# Patient Record
(demographics unavailable — no encounter records)

---

## 2024-10-16 NOTE — PHYSICAL EXAM
[Alert] : alert [Healthy Appearance] : healthy appearance [No Acute Distress] : no acute distress [Normal Sclera/Conjunctiva] : normal sclera/conjunctiva [Normal Hearing] : hearing was normal [No Respiratory Distress] : no respiratory distress [No Stigmata of Cushings Syndrome] : no stigmata of Cushings Syndrome [Normal Gait] : normal gait [Normal Insight/Judgement] : insight and judgment were intact [Kyphosis] : no kyphosis present [Acanthosis Nigricans] : no acanthosis nigricans [de-identified] : no moon facies, no supraclavicular fat pads

## 2024-10-16 NOTE — HISTORY OF PRESENT ILLNESS
[FreeTextEntry1] : Mr. Messer is a 60 year-old man with a history of chronic lymphocytic leukemia, diarrhea presenting for follow-up of subclinical hypothyroidism and weight gain. I saw him for an initial visit in July 2019 and last in April 2024.  History of subclinical hypothyroidism. Laboratory tests in May 2019 significant for TSH 4.93 uIU/mL (normal: 0.27-4.20) with normal free T4. Prior TSH 1.50 uIU/mL in July 2016. TSH in July 2019 again elevated at 4.48 uIU/mL with normal T4/T3 and negative antibodies. We started a trial of levothyroxine 50 mcg daily in July 2019; he felt no difference on levothyroxine therapy and we discontinued in November. TSH subsequently within range until April 2023, when his TSH was 7.58 uIU/mL. He was started on levothyroxine 25 mg daily at that time. We have adjusted his dose to 50 mcg daily. He is currently taking levothyroxine 50 mcg daily.  He is taking levothyroxine in the morning, on an empty stomach, with plain water, and waiting at least 30 minutes before eating. He is taking a calcium supplement and  from levothyroxine.  No history of radiation exposure other than medical imaging. No family history of thyroid disease.  Elevated body mass index. History of knee osteoarthritis.  His highest weight in our system was 187 pounds.  He estimated 1500 kcal/day intake and was walking >10K steps daily; he had multiple metacarpal fractures and physical activity was subsequently limited.  In November 2019 we started bupropion with good effect. He stopped in February 2021 during hospitalization for COVID-19 infection and restarted in July 2021. In May 2023 we started naltrexone 25 mg daily; he had been off due to a lapsed prescription.  He is currently taking bupropion  mg daily and naltrexone 25 mg daily.  His personal goal weight is 150 pounds.   Osteopenia.  Bone density testing in May 2022 significant for T-scores of -0.5 at the lumbar spine, -1.5 at the femoral neck, and -0.9 at the total hip. His 10 year fracture risk calculated by FRAX is 5.3% for major osteoporotic fracture and 0.6% for hip fracture, below the treatment thresholds.  He has a history of metatarsal fractures and a stress fracture in his foot.  No parental history of hip fracture.  He has at least a glass of milk daily and a calcium supplement with vitamin D.   Interim History  Last TSH within range as below on levothyroxine 50 mcg daily.  He was hospitalized for Campylobacter fetus bacteremia and pericarditis and has been on intravenous immunoglobulin and meropenem therapy.  He has seen multiple providers; notes reviewed.  Weight is down 14 pounds since last visit. No acute issues. Medical and surgical history, medications, allergies, social and family history reviewed and updated as needed.

## 2024-10-16 NOTE — ASSESSMENT
[FreeTextEntry1] : Subclinical hypothyroidism. We started a trial of levothyroxine for TSH above the upper limit of normal but below 6.9 uIU/mL; he noted no difference in symptoms on levothyroxine therapy and we discontinued. TSH was subsequently above 7.5 uIU/mL and levothyroxine therapy was restarted. We have reviewed proper use and compliance with levothyroxine. He has been biochemically euthyroid on levothyroxine 50 mcg daily.  Weight gain. History of knee osteoarthritis. His weight has been up and down with his acute illness. He has been biochemically euthyroid. We will evaluate for endogenous overproduction of cortisol once his acute illness has resolved. His personal goal weight is 150 pounds. We have discussed lifestyle modifications for weight loss. We have discussed referral to nutrition. We have discussed pharmacologic options for weight loss. He is tolerating bupropion and naltrexone and we will continue. We can consider metformin or a GLP-1 receptor agonist in the future as needed.  Osteopenia. He has a history of metatarsal fractures and a stress fracture in his foot. No parental history of hip fracture. He has at least a glass of milk daily and a calcium supplement with vitamin D. His 10 year fracture risk calculated by FRAX is 5.3% for major osteoporotic fracture and 0.6% for hip fracture, below the treatment thresholds. We will consider interval bone density testing in May 2025.  Elevated normetanephrines. Laboratory evaluation for hypertension demonstrated an elevated plasma normetanephrine, although less than two times the upper limit of normal. 24 hour urine test results from June 2023 demonstrated a borderline elevated epinephrine; there would be concern if this value were 2-3 times the upper limit of normal. Metanephrines and catecholamines otherwise within the normal range in May 2023 and January 2024. No further evaluation needed at this time.   Return to see me in 6 months or earlier as needed.

## 2024-10-16 NOTE — DATA REVIEWED
[FreeTextEntry1] : Most recent bone mineral density Date: May 2, 2022 Source: Hologic Site: [facility]  Site	BMD	T-score	Change previous	Change baseline	 Lumbar spine	1.034	-0.5			 Femoral neck	0.725	-1.5			 Total hip           0.894	-0.9			 Distal radius					 DXA comments: Baseline scan at this facility  Impression: I have personally reviewed the DXA images and report. There is osteopenia at the femoral neck.

## 2024-10-21 NOTE — PHYSICAL EXAM
[Alert] : alert [Well Nourished] : well nourished [Healthy Appearance] : healthy appearance [No Acute Distress] : no acute distress [Normal Voice/Communication] : normal voice communication [No Discharge] : no discharge [No Photophobia] : no photophobia [Sclera Not Icteric] : sclera not icteric [Normal TMs] : both tympanic membranes were normal [Normal Lips/Tongue] : the lips and tongue were normal [Normal Outer Ear/Nose] : the ears and nose were normal in appearance [No Thrush] : no thrush [Boggy Nasal Turbinates] : boggy and/or pale nasal turbinates [Posterior Pharyngeal Cobblestoning] : posterior pharyngeal cobblestoning [Supple] : the neck was supple [Normal Rate and Effort] : normal respiratory rhythm and effort [No Crackles] : no crackles [Bilateral Audible Breath Sounds] : bilateral audible breath sounds [Normal Rate] : heart rate was normal  [Normal S1, S2] : normal S1 and S2 [Regular Rhythm] : with a regular rhythm [Soft] : abdomen soft [Not Tender] : non-tender [No HSM] : no hepato-splenomegaly [Normal Cervical Lymph Nodes] : cervical [No Rash] : no rash [No Skin Lesions] : no skin lesions [No clubbing] : no clubbing [No Edema] : no edema [No Cyanosis] : no cyanosis [Normal Mood] : mood was normal [Normal Affect] : affect was normal [Alert, Awake, Oriented as Age-Appropriate] : alert, awake, oriented as age appropriate [Conjunctival Erythema] : no conjunctival erythema [Suborbital Bogginess] : no suborbital bogginess (allergic shiners) [Pale mucosa] : no pale mucosa [Pharyngeal erythema] : no pharyngeal erythema [Exudate] : no exudate [Wheezing] : no wheezing was heard [Urticaria] : no urticaria [Dermatographism] : no dermatographism

## 2024-10-21 NOTE — HISTORY OF PRESENT ILLNESS
[de-identified] : DANITA MARC is a 60 year old male with CLL/SLL, PMH of PE, DVT, subclinical hypothyroidism, ascending aortic aneurism, s/p Rituximab, now on Imbruvica, returns for follow up. = s/p Rituxan (total of 6 doses) and Bendamustine (completed 1/2017), = on Imbruvica since May 2018. = former smoker (8 pack year hx) = Immune labs: --- s/p PCV13 6/2020 and PPSV 10/21, no response to vaccinations; Strep pneumo 11/2021= +3/20 --- 6/2021- protective tetanus- 0.91 --- 12/2022- normal lymphocyte immunophenotype; --- 12/2022- B-cell phenotyping, [place]: decreased CD20+, normal CD19+, predominantly memory B-cells-CD27+CD19+ (94.3%)   10/21/24: - 8/22/24 - 8/13/24: Hospitalized at [facility]- received 1st dose of immunoglobulin replacement therapy (IGRT):  Gamunex 35g From hem note: --- At the end of July, he developed progressive chest pain and proceeded to the ER, where he was found to have evidence of a small pericardial effusion and pericarditis. CTA chest was negative for PE. --- For his pleuritic chest pain, he was found to have pericarditis and discharged on colchicine and ASA. --- For his diarrhea, he was found to have campylobacter fetus bacteremia. He was treated with 10 days of Unasyn. EGD/colonoscopy were grossly unyielding. He was discharged on cholestyramine and loperamide. --- For his known hypogammoglobulinemia, he received IVIG on 8/3/24 (5g) and 8/4/24 (30g). - For his rash, a punch biopsy was done which showed urticaria. --- rash resolved  - 9/2024- recurrence of Campylobacter fetus bacteremia, started on Meropenem  - [facility] for Gamunex-C (ins. preferred product) 35 grams IV every 4 weeks with acetaminophen and diphenhydramine pre-medication.  - [place] labs from 10/14/24:  --- IgG- 815, IgA-71, IgM- 61; labs are 2 weeks post-IVIG; last IVIG-9/30/24 - On Meropenem since 10/6/24 - Overall feels better on Meropenem: diarrhea resolved; still has residual cough, postnasal drip and SOB on exertion - He used to see Dr Chau for cough after completing rituximab, was on symbicort at the time   7/3/2024 The patient, would like to start IVIG sub q.  Last month he was in [place], developed swollen  arms, lower extremity and abdominal area. DVT was r/o. PMD, placed the patient on oral steroid and antibiotic. Swelling subsided within few days.    Today, had chest, abdominal and pelves CT- by Hematogist.  -Currently, feeling well.  1/11/24: - received Prevnar 20 11/28/24 in AM, ~ 12 hours later developed chills, crampy abdominal pain with several bouts of non-bloody diarrhea, lethargy around 8PM. He called EMS however pt declined to go to the ER. Just took Acetaminophen (Tylenol). He never had vaccine reactions or similar symptoms - 12/26/23- developed symptoms of COVID-19, we spoke 12/28/23 after he tested positive, remdesivir was ordered and he received 3 doses. He states that he felt much better after remdesivir. Still has some headaches and cough. He hasnt used any inhalers, but does have albuterol at home. He is not sure who prescribed it and why.   11/27/23: No new complains and no interval problems. He may have needed 1 course of antibiotics over the summer, but not sure. He feels well. He had severe COVID-19 pneumonia in 2021, also diagnosed with PE, received Remdisivir, required O2 up to 60%. He complains of chronic postnasal drip and nasal congestion, denies colored nasal discharge He started seeing new hematologist, Dr Gibbs  6/19/23: He notes that on Friday night he swallowed a handful of pills which he normally does without issue. He feels like one of the pills is stuck in his windpipe. He has chest pain that is worse when he eats. He feels an "air buildup" behind it, has burping. No shortness of breath. He does have cough due to the sensation of "mucus building up behind the pill." He has not sought care for this.  He had a viral illness at the end of May and reports that he had fevers, persistent cough for a month. He was started on singulair which got rid of his cough.  Previous visit Dec 2022: Last visit was in June 2022. Since then he has been infection-free, no interval abx use. He states he had gross hematuria back in August. CT A/P was performed which showed bladder wall thickening, possible cystitis. Hematuria self-resolved, no antibiotics use. He is a former smoker and has not seen urology yet.  Traction bronchiectasis was seen on CT in April. He did have COVID previously. Breathing is okay. Complains of fatigue.  = received Moderna COVID-19 vaccine #4: 4/2021, 5/2021, 8/2021, 2/2022, 11/2022. had COVID-19 disease 1/2021, hospitalized for 2 weeks due to SOB, was offered ICU due to hypoxia but refused. He was on O2 after he = got home for 4 months.   HISTORY: - He injured his left foot in August 2021. He reports that he went into work wearing dress shoes, first time in a long time. When he came home, he had pain in his left foot. He did not go see a doctor for about a month, but was eventually diagnosed with multiple fractures and now is in a cast. Per patient, it is healing slowly but as expected. He's had congestion and cough for about 1 week. COVID test was negative. He is on his last day of [employer] today. Denies other illnesses or infections. Of note, patient reports that he is on almost daily saline nasal rinses and Mucinex chronically. He wants IGRT to be the last resort. He received Pneumonvax on 10/1/21 but did not have antibody response. He is still on Imbruvica. Only uses Fluticasone and Symbicort occasionally.   - admitted to [facility] 2/21 for COVID-19 pneumonia, also diagnosed with PE, received Remdisivir, required O2 up to 60%, not intubated. He had COVID-19 antibodies when he left the hospital but none by 4/2021. He still uses some O2 when works out, but overall he feels significantly better, especially in the last few weeks. - He received his 2nd Moderna vaccine 5/2021. - No other interval illnesses. or infections.  = He was found to have low quantitative immunoglobulins IgG-576, IgA-82, IgM-16, protective tetanus and low diphtheria, S.Pneumonie titers after receiving prevnar in 2016 during chemo treatment and PPSV 3/2018.  = For CLL he was treated with Rituxan (total of 6 doses) and Bendamustine (completed 1/2017), currently on Imbruvica ( since May 2018.  Interval History: - 12/15/20- Continues to feel well. He intentionally lost a lot of weight: he walks >100 ml a week, eats healthier, works from home. No interval issues. Has some Imbruvica side effects: GI, dry skin, nails but they are tolerable and he is happy with control of his CLL. He uses nasal fluticasone occasionally. = Spirometry 12/8/20 reviewed: normal = No response to PCV 6/2020; s/p PPSV23 in 2018  - 6/2020- He has been doing very well during pandemic. He has been working from home and feels that the stress of 4 hour commute a day is gone and now he is able to walk daily, eats healthier, sleeps better. He lost weight (on purpose) and feels healthier. No illnesses since 11/2019. No sick contacts, including COVID. He has been consistent with Imbruvica ad develops LAP every time he tries to stop it. - 11/2019- had fever to 103.8 with severe diarrhea necessitating ED visit and Azithromycin course. Diarrhea lasted 3 weeks and did not determine the cause. It happened a few days after getting the flu shot. Also has continued persistent nasal congestion that is treated with sinus rinses and Nasonex but has improved from previously. Previously, we recommended starting IGRT therapy but patient has been resistant to starting this treatment. He is here to follow up on labs and discuss this further.  History of infections: 11/2018- sick for over 3 months with sinus infection, C/S by Dr Casas: HIB, Staph lucdusensis. He was treated with a long course of Amoxicillin/Clavulanate Potassium (Augmentin) and Prednisone 2001- pneumonia b/l, required hospitalization He always had sinus issues, he had his nose broken many times playing soccer, s/p 2 rhinoplasties 30 years ago.

## 2024-10-22 NOTE — HEALTH RISK ASSESSMENT
[Intercurrent hospitalizations] : was admitted to the hospital  [Never (0 pts)] : Never (0 points) [No] : In the past 12 months have you used drugs other than those required for medical reasons? No [Little interest or pleasure doing things] : 1) Little interest or pleasure doing things [Feeling down, depressed, or hopeless] : 2) Feeling down, depressed, or hopeless [0] : 2) Feeling down, depressed, or hopeless: Not at all (0) [PHQ-2 Negative - No further assessment needed] : PHQ-2 Negative - No further assessment needed [None] : None [With Significant Other] : lives with significant other [Employed] : employed [College] : College [Significant Other] : lives with significant other [Sexually Active] : sexually active [Feels Safe at Home] : Feels safe at home [Fully functional (bathing, dressing, toileting, transferring, walking, feeding)] : Fully functional (bathing, dressing, toileting, transferring, walking, feeding) [Fully functional (using the telephone, shopping, preparing meals, housekeeping, doing laundry, using] : Fully functional and needs no help or supervision to perform IADLs (using the telephone, shopping, preparing meals, housekeeping, doing laundry, using transportation, managing medications and managing finances) [Smoke Detector] : smoke detector [Carbon Monoxide Detector] : carbon monoxide detector [Safety elements used in home] : safety elements used in home [Seat Belt] :  uses seat belt [Sunscreen] : uses sunscreen [With Patient/Caregiver] : , with patient/caregiver [Reviewed no changes] : Reviewed, no changes [I will adhere to the patient's wishes.] : I will adhere to the patient's wishes. [Former] : Former [5-9] : 5-9 [NO] : No [de-identified] : Infectious disease, hematology oncology [Audit-CScore] : 0 [[id number]] : 0 [Change in mental status noted] : No change in mental status noted [Language] : denies difficulty with language [Behavior] : denies difficulty with behavior [Learning/Retaining New Information] : denies difficulty learning/retaining new information [Handling Complex Tasks] : denies difficulty handling complex tasks [Reasoning] : denies difficulty with reasoning [Spatial Ability and Orientation] : denies difficulty with spatial ability and orientation [High Risk Behavior] : no high risk behavior [Reports changes in hearing] : Reports no changes in hearing [Reports changes in vision] : Reports no changes in vision [Reports normal functional visual acuity (ie: able to read med bottle)] : Reports poor functional visual acuity.  [Reports changes in dental health] : Reports no changes in dental health [Travel to Developing Areas] : does not  travel to developing areas [TB Exposure] : is not being exposed to tuberculosis [Caregiver Concerns] : does not have caregiver concerns [AdvancecareDate] : 10/24

## 2024-10-22 NOTE — PHYSICAL EXAM
[No Acute Distress] : no acute distress [Well Nourished] : well nourished [Well Developed] : well developed [Well-Appearing] : well-appearing [Normal Sclera/Conjunctiva] : normal sclera/conjunctiva [PERRL] : pupils equal round and reactive to light [EOMI] : extraocular movements intact [Normal Outer Ear/Nose] : the outer ears and nose were normal in appearance [Normal Oropharynx] : the oropharynx was normal [No JVD] : no jugular venous distention [No Lymphadenopathy] : no lymphadenopathy [Supple] : supple [Thyroid Normal, No Nodules] : the thyroid was normal and there were no nodules present [No Respiratory Distress] : no respiratory distress  [No Accessory Muscle Use] : no accessory muscle use [Clear to Auscultation] : lungs were clear to auscultation bilaterally [Normal Rate] : normal rate  [Regular Rhythm] : with a regular rhythm [Normal S1, S2] : normal S1 and S2 [No Murmur] : no murmur heard [No Carotid Bruits] : no carotid bruits [No Abdominal Bruit] : a ~M bruit was not heard ~T in the abdomen [No Varicosities] : no varicosities [Pedal Pulses Present] : the pedal pulses are present [No Edema] : there was no peripheral edema [No Palpable Aorta] : no palpable aorta [No Extremity Clubbing/Cyanosis] : no extremity clubbing/cyanosis [Soft] : abdomen soft [Non Tender] : non-tender [Non-distended] : non-distended [No Masses] : no abdominal mass palpated [No HSM] : no HSM [Normal Bowel Sounds] : normal bowel sounds [Normal Posterior Cervical Nodes] : no posterior cervical lymphadenopathy [Normal Anterior Cervical Nodes] : no anterior cervical lymphadenopathy [No CVA Tenderness] : no CVA  tenderness [No Spinal Tenderness] : no spinal tenderness [No Joint Swelling] : no joint swelling [Grossly Normal Strength/Tone] : grossly normal strength/tone [Coordination Grossly Intact] : coordination grossly intact [No Focal Deficits] : no focal deficits [Normal Gait] : normal gait [Deep Tendon Reflexes (DTR)] : deep tendon reflexes were 2+ and symmetric [Speech Grossly Normal] : speech grossly normal [Memory Grossly Normal] : memory grossly normal [Normal Affect] : the affect was normal [Alert and Oriented x3] : oriented to person, place, and time [Normal Mood] : the mood was normal [Normal Insight/Judgement] : insight and judgment were intact [de-identified] : Rash some residual but I would have to say greater than 99% resolved.

## 2024-10-22 NOTE — HISTORY OF PRESENT ILLNESS
[Post-hospitalization from ___ Hospital] : Post-hospitalization from [unfilled] Hospital [Admitted on: ___] : The patient was admitted on [unfilled] [Discharged on ___] : discharged on [unfilled] [Discharge Summary] : discharge summary [Pertinent Labs] : pertinent labs [Radiology Findings] : radiology findings [Discharge Med List] : discharge medication list [Other: ____] : [unfilled] [Med Reconciliation] : medication reconciliation has been completed [Patient Contacted By: ____] : and contacted by [unfilled] [FreeTextEntry2] : Patient is a 60-year-old gentleman who presents today for follow-up status post hospitalization.  Medical history significant for CLL/SLL, hypogammaglobulinemia, recurrent DVT/PE x 2 and recent diagnosis of acute pericarditis, subclinical hypothyroidism, ascending aortic aneurysm who presented at [facility] from outpatient clinic for positive blood cultures that his Campylobacter Fetus.  Patient was first admitted to [facility] on August 2024 for diarrhea that had started previously on in May 2024 which was accompanied with arthralgias, rash, chest pain, shortness of breath subsequently diagnosed with Campylobacter and patient was treated for 7 days with Unasyn blood cultures cleared early August that is August 6, 2024 and subsequently patient was discharged with antibiotics.  Unfortunately symptoms returned blood cultures obtained as an outpatient again positive for Campylobacter subsequently the patient was admitted at [facility] started on meropenem and was followed by Dr. Pearl who also follows the patient as an outpatient.  Complete discharge summary in chart.  Patient was admitted on October 10, 2024 subsequently discharged on October 12, 2024 PICC line was placed on October 11 meropenem started 1 g IV every 8 hours via PICC line.

## 2024-10-22 NOTE — ASSESSMENT
[FreeTextEntry1] : Assessment and plan:  1.  Status post hospitalization patient treated for Campylobacter bacteremia, PICC line placed on October 11, 2024 presently on meropenem IV 1 g IV Q8 treatment will go on until November 3 patient is following up with infectious diseases tomorrow.  2.  Fluzone influenza vaccine administered at this visit administered left deltoid.  3.  Hypothyroidism continue levothyroxine 25 mcg daily.  Endocrinology requested follow-up blood work which was done at this visit I did check free T3, free T4 TSH, comprehensive metabolic.  4.  CLL patient followed closely by hematology oncology he will continue present medical management with Imbruvica 420 mg 1 tablet daily.  Reviewed most recent consultation.  5.  Gastroesophageal reflux continue omeprazole 40 mg p.o. daily patient takes medications in a.m. before breakfast.  Patient also has history of colon polyps he is a bit behind with colonoscopy unfortunately his gastroenterologist left the system therefore referral given for gastroenterology evaluation and for colonoscopy.  6.  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  7.  Hyperlipidemia patient does follow a low-fat low-cholesterol diet walks approximately 5 miles daily and will continue rosuvastatin 10 mg p.o. daily.  8.  Patient will continue present medical management no change, I will review comprehensive blood work when available follow-up appointment in 6 months sooner if necessary.  Total time spent face-to-face and non-face-to-face time 45 minutes the majority of which was spent on counseling and coordination of care.

## 2024-10-22 NOTE — REVIEW OF SYSTEMS
[Fatigue] : fatigue [Cough] : cough [Dyspnea on Exertion] : dyspnea on exertion [Diarrhea] : diarrhea [Skin Rash] : skin rash [Negative] : Heme/Lymph [Wheezing] : no wheezing [Abdominal Pain] : no abdominal pain [Nausea] : no nausea [Constipation] : no constipation [Vomiting] : no vomiting [Heartburn] : no heartburn [Melena] : no melena [FreeTextEntry7] : diarrhea is not much improved with antibiotic treatment. [de-identified] : Rash is painful to palpation and blanches

## 2024-10-23 NOTE — ASSESSMENT
[FreeTextEntry1] : # Recurrent/persistent C.fetus bacteremia - no clear focus of infection identified on extensive workup other than possible ongoing bowel source. Improving. - Continue meropenem 1g IV q8h x minimum 4 week duration (EOT 11/3) - Will call patient next week to re-assess symptoms and decide on if can stop abx as planned on 11/3. His symptoms are already much improved and resolving, and I anticipate we will be able to stop abx on 11/3. - After abx discontinuation will follow patient closely to monitor for recurrent sx, and also will check BCx after off abx ~2 weeks. - RTC 3 weeks  # Mild tachycardia - no LUE swelling, PHILLIPS present for months and is slowly improving. Low suspicion at this time for PE, but advised patient to monitor for worsened PHILLIPS, chest pain, LUE swelling and present to ED immediately if these develop.

## 2024-10-23 NOTE — PHYSICAL EXAM
[General Appearance - Alert] : alert [General Appearance - Well-Appearing] : healthy appearing [Oropharynx] : the oropharynx was normal with no thrush [Auscultation Breath Sounds / Voice Sounds] : lungs were clear to auscultation bilaterally [Abdomen Soft] : soft [Abdomen Tenderness] : non-tender [FreeTextEntry1] : No LUE swelling. LUE midline in place, dressing c/d/i [] : no rash [Oriented To Time, Place, And Person] : oriented to person, place, and time

## 2024-10-23 NOTE — HISTORY OF PRESENT ILLNESS
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 Soon after he developed fevers and night sweats, and then in 6/2024 a rash on torso and legs. Then in 8/2024 he started developing positional chest pain and SOB (fevers/sweats/diarrhea/rash had persisted through this time), and he was admitted to [facility] in 8/2024 for these sx and found to have C.fetus bacteremia and possible infectious pericarditis, treated with ~7 days of unasyn, BCx cleared, and then discharged without additional antibiotics. He initially felt better and sx improved somewhat, however in 9/2024 all of his his symptoms returned (rash, diarrhea, chest pain, fevers, night sweats). Repeat blood cultures 9/26 at outpatient grew C.fetus again. I called patient once I was alerted to these results last week and advised him to present to ED which he did on 10/6 after arranging some things at home (dog care etc). Repeat BCx 10/6 and 10/7 negative (may be from few doses of cipro at home before coming in). PET/CT without any clear focus of infection other than noted bowel uptake, and posterior R humerus uptake (R arm completely normal on exam, and no hx of surgery there). TTE with resolved pericardial effusion, no valvular lesions. SOCRATES negative. BUE/BLE duplex neg. A LUE midline was placed on 10/11 and patient was discharged home with meropenem 1g IV q8h to complete minimum 4 week duration (EOT 11/3). Following with immunology for IVIG.  Since hospital discharge patient reports feeling much improved. Sweats much improved - now only ~1 out of 3 nights. Diarrhea resolving, more solid, still going 2-3x/day. Chest pain resolved. Does have a cough (productive small clear sputum) and some PHILLIPS (but was able to walk 40 blocks to office today, and PHILLIPS is improving). No L arm swelling.

## 2024-11-01 NOTE — PHYSICAL EXAM
[TextEntry] : General: NAD Cardiac: nl s1s2, RRR, no mrg, no JVD, no peripheral edema Lungs: CTAB. Normal respiratory effort Vascular: lower extremities warm/well perfused, midline in place in LUE Neuro: moves all extremities Psych: normal affect, normal mood

## 2024-11-01 NOTE — HISTORY OF PRESENT ILLNESS
[FreeTextEntry1] : Mr. MARC is a 60 year male presenting for management of acute pericarditis. Here with his gibran Cowan.   Notable PMHx: - Incessant Pericarditis 2/2 campylobacter fetus (dx in 8/2024) - Ascending Aortic Aneurysm (4.1 cm by TTE in 9/2024) - recurrent DVT/PE on chronic Eliquis - CLL/SLL (s/p bendamustine/rituximab, on daily ibrutinib) - hypogammaglobulinemia - subclinical hypothyroidism  HPI: Blood cx ordered at last visit came back with campylobacter. Eventually presented to [facility] for management he was seen by ID, cards, and onc. He underwent SOCRATES and PET/CT that showed no evidence of endocarditis or focus for his campylobacter fetus. Thought to be GI translocation in setting of immunocompromised state. He was sent home with a midline and meropenem x 4 weeks. Since discharge, no chest pain. He has quite a bit phlegm and secretions but otherwise no fevers. Rare night sweats and diarrhea, much better since discharge. No rashes  9/26/24 TTE showed resolution of pericardial effusion. He reports interval worsening of his chest pain and has developed night sweats as well. He reports a small circular erythematous rashes on his abdomen that were similar in appearance to when he first initially presented. He had a low grade temp to 100F last week but otherwise has not had any fevers. Notes diarrhea that has been worsening since discharge. Otherwise no recent travel, sick contacts, N/V. He was seen by his oncologist yesterday who recommended he follow-up with ID and he is currently awaiting an appointment for this.   Visit 9/12/24: Since last visit, his pain has improved but is still present. Though he notes the pain is actually better when laying flat changed from prior. He did have some constant pain in the same area that resolved with one dose of tylenol. No bleeding issues while on high dose ASA. No dyspnea.  Visit 9/5/2024: Here for follow up after hospital admission at [facility] 8/2-8/13  for multiple symptoms including diarrhea, fevers, abdominal distention, rash, and chest pain. Found to have diffuse ST elevation, ESR 69, , and TTE with normal biventricular function and a small pericardial effusion.  The differential for his pericarditis was ibrutinib related though he had been on this for 7 years and campylobacter bacteremia. He was treated with abx and ASA, colchicine, and PPI for pericarditis. Since discharge his pain was initially improving but began to recur since he had IVIG on Saturday. He reports the same positional, pleuritic chest pain though of significantly lesser severity than prior but increased when compared to immeidately prior to IVIG. He experiences dyspnea after walking his dogs about 3 miles though this was occurring well prior to his presentation. His pain is not exertional. Increased ASA dose with close follow-up and repeat TTE  Prior Data Review Relevant Labs - ESR: 73 (7/24/24) --> 69 (8/2/24) --> 73 (9/12/24) --> 29 (10/7/224) - CRP: 140 (7/24/24) --> 247 (8/2/24) --> 61 (9/12/24) --> 5.1 (10/7/24) EKG - 11/1/24: SR. Low voltage. nonspecific ST/T wave abnormality.  - 9/26/2024. SR. Anterior TWI. - 9/11/2024: SR. Nonspecific T wave abnormality. - 9/5/2024: SR. Diffuse TWI likely representing resolution of pericarditis. Compared to prior EKG on 8/3/2024 the TWI are new and ST elevations have resolved. Echo - 10/9/24 SOCRATES: mild MR, mild TR, mildly dilated aortic root 4.1, no vegetations - 10/7/24 TTE: nl LV and RV, no sig valve disease, no effusion, root mildly dilated 4.2 - 9/2024 TTE: LVEF 55-60%, nl RV, mild TR, dilated aortic root 4.1cm, dilated ascending aorta (4.0m), no pericardial effusion - 8/2024 TTE: LVEF 54%, no rwma, g1dd, mildly enlarged RV with normal size, trace pericardial effusion.  Cath - no prior Stress Test - no prior  Plan - ASA stopped d/t bleeding risk - c/w colchicine

## 2024-11-01 NOTE — DISCUSSION/SUMMARY
[EKG obtained to assist in diagnosis and management of assessed problem(s)] : EKG obtained to assist in diagnosis and management of assessed problem(s) [FreeTextEntry1] : Here for follow-up for management of acute pericarditis.  He had recurrence of Campylobacter bacteremia requiring readmission.  During admission his SOCRATES and PET showed no evidence of endocarditis or foci of infection.  Showed no pericardial effusion as well.  Inflammatory markers have trended down. Pleuritic chest pain resolved. He is now on meropenem for 4 weeks via midline.  # Incessant Pericarditis 2/2 Campylobacter Fetus Bacteremia - ASA stopped given his bleeding risk while on eliquis and having completed 2 months of treatment - c/w colchicine x 6 months total (end date in 2/2025)  RTC in 3 months or sooner PRN

## 2024-11-06 NOTE — ASSESSMENT
[FreeTextEntry1] : Advised the following:  Cholestyramine twice daily for 4-6 weeks Can take imodium as needed but would limit to twice daily. Probiotic daily. Regular diet.  Follow up in 8 weeks.

## 2024-11-06 NOTE — HISTORY OF PRESENT ILLNESS
[de-identified] : [facility] ____________________________________________________________________________________________________ Patient Name: Gurdeep Messer                         MRN: [id number] Account Number: [id number]                     YOB: 1964 Room: Endoscopy Room 3                           Gender: Male Attending MD: Glenn Bundy MD          Procedure Date No Time: 8/12/2024 ____________________________________________________________________________________________________   Procedure:           Upper GI endoscopy Indications:         Diarrhea Providers:           Glenn Bundy MD, Brett Du (Fellow) Medicines:           See the Anesthesia note for documentation of the administered medications Complications:       No immediate complications. ____________________________________________________________________________________________________ Procedure:           Pre-Anesthesia Assessment:                      - Prior to the procedure, a History and Physical was performed, and patient                       medications and allergies were reviewed. The patient is competent. The risks                       and benefits of the procedure and the sedation options and risks were                       discussed with the patient. All questions were answered and informed consent                       was obtained. Patient identification and proposed procedure were verified by                       the physician and the nurse in the pre-procedure area. Mental Status                       Examination: alert and oriented. Prophylactic Antibiotics: The patient does                       not require prophylactic antibiotics. Prior Anticoagulants: The patient has                       taken no previous anticoagulant or antiplatelet agents. ASA Grade Assessment:                       III - A patient with severe systemic disease. After reviewing the risks and                       benefits, the patient was deemed in satisfactory condition to undergo the                       procedure. The anesthesia plan was to use monitored anesthesia care (MAC).                       Immediately prior to administration of medications, the patient was                       re-assessed for adequacy to receive sedatives. The heart rate, respiratory                       rate, oxygen saturations, blood pressure, adequacy of pulmonary ventilation,                       and response to care were monitored throughout the procedure. The physical                       status of the patient was re-assessed after the procedure.                      After obtaining informed consent, the endoscope was passed under direct                       vision. Throughout the procedure, the patient's blood pressure, pulse, and                       oxygen saturations were monitored continuously. The was introduced through                       the mouth, and advanced to the second part of duodenum. The upper GI                       endoscopy was accomplished without difficulty. The patient tolerated the                       procedure well.                                                                                                      Findings:      The Z-line was irregular and was found 43 cm from the incisors.      The examined esophagus was normal.      The entire examined stomach was normal. This was biopsied with a cold forceps for histology.      The duodenal bulb and second portion of the duodenum were normal. Biopsies were taken with a       cold forceps for histology.                                                                                                      Impression:          - Z-line irregular, 43 cm from the incisors.                      - Normal esophagus.                      - Normal stomach. Biopsied.                      - Normal duodenal bulb and second portion of the duodenum. Biopsied second                       portion of the duodenum. Recommendation:      - Resume regular diet.                      - Please start cholestyramine daily and immodium PRN for diarrhea while                       awaiting biopsy results                      - Await Pathology results                                                                                                      Attending Participation:      I was present and participated during the entire procedure, including non-key portions.                                                                                                       ________________________ Glenn Bundy MD 8/12/2024 1:51:11 PM Number of Addenda: 0  [FreeTextEntry1] : [facility] ____________________________________________________________________________________________________ Patient Name: Gurdeep Messer                         MRN: [id number] Account Number: [id number]                     YOB: 1964 Room: Endoscopy Room 3                           Gender: Male Attending MD: Glenn Bundy MD          Procedure Date No Time: 8/12/2024 ____________________________________________________________________________________________________   Procedure:           Colonoscopy Indications:         Chronic diarrhea Providers:           Glenn Bundy MD, Brett Du (Fellow) Medicines:           See the Anesthesia note for documentation of the administered medications Complications:       No immediate complications. ____________________________________________________________________________________________________ Procedure:           Pre-Anesthesia Assessment:                      - Prior to the procedure, a History and Physical was performed, and patient                       medications and allergies were reviewed. The patient is competent. The risks                       and benefits of the procedure and the sedation options and risks were                       discussed with the patient. All questions were answered and informed consent                       was obtained. Patient identification and proposed procedure were verified by                       the physician and the nurse in the pre-procedure area. Mental Status                       Examination: alert and oriented. ASA Grade Assessment: III - A patient with                       severe systemic disease. After reviewing the risks and benefits, the patient                       was deemed in satisfactory condition to undergo the procedure. The anesthesia                       plan was to use monitored anesthesia care (MAC). Immediately prior to                       administration of medications, the patient was re-assessed for adequacy to                       receive sedatives. The heart rate, respiratory rate, oxygen saturations,                       blood pressure, adequacy of pulmonary ventilation, and response to care were                       monitored throughout the procedure. The physical status of the patient was                       re-assessed after the procedure.                      After I obtained informed consent, the scope was passed under direct vision.                       Throughout the procedure, the patient's blood pressure, pulse, and oxygen                       saturations were monitored continuously. The Colonoscope was introduced                       through the anus and advanced to the cecum, identified by appendiceal orifice                       and ileocecal valve. The colonoscopy was performed without difficulty. The                       quality of the bowel preparation was evaluated using the BBPS ([place] Bowel                       Preparation Scale) with scores of: Right Colon = 3, Transverse Colon = 3 and                       Left Colon = 3 (entire mucosa seen well with no residual staining, small                       fragments of stool or opaque liquid). The total BBPS score equals 9.                                                                                                      Findings:      The perianal and digital rectal examinations were normal. Pertinent negatives include normal       sphincter tone.      The terminal ileum appeared normal.      A single small-mouthed diverticulum was found in the ascending colon.      The colon (entire examined portion) otherwise appeared normal. Biopsies for histology were       taken with a cold forceps from the right colon and left colon for evaluation of microscopic       colitis. Estimated blood loss was minimal.                                                                                                      Impression:          - Small diverticulum in ascending colon                      - Colon otherwise normal. No evidence of inflammation or ulceration. Biopsies                       taken of descending and ascending colon for work up of chronic diarrhea                      - The examination was otherwise normal. Recommendation:      - Await pathology results.                      - Perform an upper GI endoscopy today.                                                                                                      Attending Participation:      I was present and participated during the entire procedure, including non-key portions.                                                                                                       ________________________ Glenn Bundy MD 8/12/2024 1:53:37 PM Number of Addenda: 0

## 2024-11-15 NOTE — ASSESSMENT
[FreeTextEntry1] : # Recurrent/persistent C.fetus bacteremia- no clear focus of infection identified on extensive workup other than possible ongoing bowel source # Pericarditis 2/2 the above - resolved - S/p 4 weeks of meropenem (EOT 11/3) with significant improvement/resolution in all symptoms. However, since stopping meropenem his diarrhea has returned. No other associated symptoms of infection with this. DDx includes antiboitic associated diarrhea, CDIFF, persistent C.fetus infection, or a coincidental gastroenteritis - Send BCx x2, GI PCR, stool cx, CDIFF test, CBC/CMP/ESR/CRP and follow up in 1 week to re-evalaute - I gave patient strict ED precautions for any fevers/chills/malaise, or significantly worsening diarrhea

## 2024-11-15 NOTE — HISTORY OF PRESENT ILLNESS
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 Soon after he developed fevers and night sweats, and then in 6/2024 a rash on torso and legs. Then in 8/2024 he started developing positional chest pain and SOB (fevers/sweats/diarrhea/rash had persisted through this time), and he was admitted to [facility] in 8/2024 for these sx and found to have C.fetus bacteremia and possible infectious pericarditis, treated with ~7 days of unasyn, BCx cleared, and then discharged without additional antibiotics. He initially felt better and sx improved somewhat, however in 9/2024 all of his his symptoms returned (rash, diarrhea, chest pain, fevers, night sweats). Repeat blood cultures 9/26 at outpatient grew C.fetus again. He was admitted to the hospital for this issue in early 10/2024. Repeat BCx 10/6 and 10/7 negative (may be from few doses of cipro at home before coming in). PET/CT without any clear focus of infection other than noted bowel uptake, and posterior R humerus uptake (R arm completely normal on exam, and no hx of surgery there). TTE with resolved pericardial effusion, no valvular lesions. SOCRATES negative. BUE/BLE duplex neg. A LUE midline was placed on 10/11 and patient was discharged home with meropenem 1g IV q8h to complete minimum 4 week duration (EOT 11/3). Following with immunology for IVIG.  Patient felt much improved with meropenem, and on my discussion with him 11/1 he reported ~resolved nightly sweats, resolved chest pain, and significantly improved diarrhea (still slight diarrhea which he associated with the abx) and so we discontinued abx as planned on 11/3.  Since stopping abx patient noticed that diarrhea is progressively worsening again. Non-bloody, associated with some cramps, now up to ~6 episodes per day. Overall, he still feels much improved from prior- no recurrent sweats, chest pain, rash, fatigue/malaise, or any new sx.

## 2024-11-22 NOTE — HISTORY OF PRESENT ILLNESS
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 Soon after that he developed fevers and night sweats, and then in 6/2024 a rash on torso and legs. Then in 8/2024 he started developing positional chest pain and SOB (fevers/sweats/diarrhea/rash had persisted through this time), and he was admitted to [facility] in 8/2024 for these sx and found to have C.fetus bacteremia and possible infectious pericarditis, treated with ~7 days of unasyn, BCx cleared, and then discharged without additional antibiotics. He initially felt better and sx improved somewhat, however in 9/2024 all of his his symptoms returned (rash, diarrhea, chest pain, fevers, night sweats). Repeat blood cultures 9/26 at outpatient grew C.fetus again. He was admitted to the hospital for this issue in early 10/2024. Repeat BCx 10/6 and 10/7 negative (may be from few doses of cipro at home before coming in). PET/CT without any clear focus of infection other than noted bowel uptake, and posterior R humerus uptake (R arm completely normal on exam, and no hx of surgery there). TTE with resolved pericardial effusion, no valvular lesions. SOCRATES negative. BUE/BLE duplex neg. A LUE midline was placed on 10/11 and patient was discharged home with meropenem 1g IV q8h to complete minimum 4 week duration (EOT 11/3). Following with immunology for IVIG.  Patient felt much improved with meropenem, and prior to stopping abx on 11/3 his symptoms had ~completely resolved - no further night sweat or chest pain, and significantly improved diarrhea (still had slight diarrhea which he associated with the abx).  After stopping abx on 11/3 patient has developed progressively worsening diarrhea. Non-bloody, associated with some cramps, now up to ~6 episodes per day. Overall, he still feels much improved from prior- no recurrent sweats, chest pain, rash, fatigue/malaise, or any new sx.  At last visit 11/15 we checked labs and blood cultures- labs with mild elevated AST/ALT, neg inflamm markers, neg BCx x2. We are still awaiting GI PCR, stool cx, and CDIFF test (patient has not yet delivered sample to lab).

## 2024-11-22 NOTE — ASSESSMENT
[FreeTextEntry1] : # Recurrent/persistent C.fetus bacteremia- resolved # Pericarditis 2/2 the above - resolved - S/p 4 weeks of meropenem (EOT 11/3) with significant improvement/resolution in all symptoms. However, since stopping meropenem his diarrhea has returned. No other associated symptoms of infection with this. DDx includes antiboitic associated diarrhea, CDIFF, persistent C.fetus infection, or a coincidental gastroenteritis. If these tests are all negative, would consider a trial of levaquin x 1 week for possible residual C.fetus enteritis (bloodstream isolate from 9/26 had low MERCEDES) - I advised patient to bring stool specimens to lab ASAP - he will plan to do it today, he has all necessary supplies at home.  - Repeat CBC/CMP/ESR/CRP and BCx x1 - I gave patient strict ED precautions for any fevers/chills/malaise, or significantly worsening diarrhea  RTC next available 12/4

## 2024-11-22 NOTE — PHYSICAL EXAM
[General Appearance - Alert] : alert [General Appearance - Well-Appearing] : healthy appearing [Oropharynx] : the oropharynx was normal with no thrush [] : no respiratory distress [Auscultation Breath Sounds / Voice Sounds] : lungs were clear to auscultation bilaterally [Abdomen Soft] : soft [Oriented To Time, Place, And Person] : oriented to person, place, and time [Heart Rate And Rhythm] : heart rate was normal and rhythm regular [FreeTextEntry1] : Few small macular spots on abdomen, appear to be bruises

## 2024-12-04 NOTE — REASON FOR VISIT
[Home] : at home, [unfilled] , at the time of the visit. [Medical Office: ([facility])___] : at the medical office located in  [Verbal consent obtained from patient] : the patient, [unfilled] [Follow-Up: _____] : a [unfilled] follow-up visit

## 2024-12-04 NOTE — ASSESSMENT
[FreeTextEntry1] : # Recurrent/persistent C.fetus bacteremia- resolved # Pericarditis 2/2 the above - resolved - S/p 4 weeks of meropenem (EOT 11/3) with significant improvement/resolution in all symptoms. However, since stopping meropenem his diarrhea has returned. No other associated symptoms of infection with this as above. Repeat BCx x3 negative, stool cx negative, CDIFF tox/GDH and PCR negative, GI PCR indeterminate for norovirus x2. - My suspicion at this time is for norovirus gastroenteritis which can be prolonged in immunocompromised hosts and for which there is no antiviral treatment available. I advised continued symptomatic management, adequate hydration, and I gave patient strict ED precautions for any fevers/chills/malaise, or significantly worsening diarrhea.  RTC PRN

## 2024-12-04 NOTE — HISTORY OF PRESENT ILLNESS
[FreeTextEntry1] : Patient reports persistent diarrhea, 3-4 episodes per day, loose, non-bloody, associated with intermittent mild abdominal cramping. No other symptoms - no night sweats, no chest pain, no joint pains, no rash. Please see my note from 11/22 for more detailed HPI.

## 2024-12-23 NOTE — REVIEW OF SYSTEMS
[Chest Pain] : chest pain [Abdominal Pain] : abdominal pain [Skin Rash] : skin rash [Fever] : no fever [Chills] : no chills [Night Sweats] : no night sweats [Fatigue] : no fatigue [Eye Pain] : no eye pain [Vision Problems] : no vision problems [Dysphagia] : no dysphagia [Odynophagia] : no odynophagia [Palpitations] : no palpitations [Lower Ext Edema] : no lower extremity edema [Shortness Of Breath] : no shortness of breath [Wheezing] : no wheezing [Cough] : no cough [Vomiting] : no vomiting [Dysuria] : no dysuria [Joint Pain] : no joint pain [Fainting] : no fainting [Depression] : no depression [Muscle Weakness] : no muscle weakness [Easy Bleeding] : no tendency for easy bleeding [Easy Bruising] : no tendency for easy bruising [FreeTextEntry7] : +diarrhea [de-identified] : +xerosis of fingers

## 2024-12-23 NOTE — END OF VISIT
[] : Fellow [FreeTextEntry3] : I evaluated hte patient with the hematology fellow, Dr Yun.  The patient is followed for CLL.  Since hte last visit he was readmitted to the hospital with Campylobacter fetus bacteremia.  He ultimately completed a prolonged course of meropenem.  He is feeling better since that time, except for persistent diarrhea for which he is taking cholestyramine.  He also continues IVIG under Dr Cruz's direction.    Reviewed CBC + CMP from 11/22/24 - reassuring.  Plan: 1. CLL/SLL - no plans to discontinue ibrutinib.  Recent labs reassuring - no concerns for disease progression.  He would be at risk for rapid disease progression if this drug were stopped/held.  Continue therapy.  CBC and CMP every 4 months barring problems in the interim.  2.  recent campylobacter infection - has been off IV antibiotics for > 1 month and has not had any recurrence of symptoms.  hopefully he has turned a corner from this infection.       3.  hypogammaglobulinemia - now on IVIG - managed by Dr Cruz immunologist.   --vaccinations (influenza, covid-19) strongly encouraged.  4.  DVT/PE - continue eliquis  RTC 4 months, sooner if issues.

## 2024-12-23 NOTE — PHYSICAL EXAM
[Fully active, able to carry on all pre-disease performance without restriction] : Status 0 - Fully active, able to carry on all pre-disease performance without restriction [Normal] : affect appropriate [de-identified] : EOMI, no conjunctival injection, anicteric [de-identified] : supple [de-identified] : No calf tenderness or lower extremity swelling. [de-identified] : No palpable cervical, supraclavicular, axillary, or inguinal lymphadenopathy. [de-identified] : +xerosis with few <1cm areas of cracked skin, no warmth, purulence or fluctuance

## 2024-12-23 NOTE — HISTORY OF PRESENT ILLNESS
[de-identified] : Mr Gurdeep Messer is a 60 year old male here for f/u of CLL/SLL.  Hematologic/Oncologic history: 1.  CLL/SLL --pt reports original dx in [place] in the mid 1990s.  watchful waiting --presented with extensive lymphadenopathy in 2016.  excisional biopsy confirmed SLL.  FISH with del(11q).  Treated by Dr Winslow with bendamustine/rituximab x 6 cycles --attained response to BR but lasted < 1 year --progressive, bulky adenopathy in 2018.  Started Ibrutinib.  2.  hypogammaglobulinemia --likely due to CLL/SLL and treatment --has had severe infections - including a prolonged and severe bout with Covid-19.   Campylobacter bacteremia/sepsis in Summer 2024. --started IVIG Summer 2024  3.  recurrent DVT/PE --first in 2016 PICC line associated during chemotherapy --then recurrent pulmonary embolism in 2021 associated with covid-19 --long term eliquis 2.5 mg po BID --hx of weak positive anticardiolipin antibody but not fulfilling criteria for APLAS.  MTHFR mutation of questionable significance.  PMH, PSH reviewed and updated in [facility] -   - . lives in [place].  Works for [employer] in [place].  former smoker.  originally from [place].  -------------------------------------------------------------------------------------------------------  8/22/24 - 8/13/24: Hospitalized at [facility]- At the end of July, he developed progressive chest pain and proceeded to the ER, where he was found to have evidence of a small pericardial effusion and pericarditis. CTA chest was negative for PE. - For his pleuritic chest pain, he was found to have pericarditis and discharged on colchicine and ASA. - For his diarrhea, he was found to have campylobacter fetus bacteremia. He was treated with 10 days of Unasyn. EGD/colonoscopy were grossly unyielding. He was discharged on cholestyramine and loperamide. - For his known hypogammoglobulinemia, he received IVIG on 8/3/24 (5g) and 8/4/24 (30g). - For his rash, a punch biopsy was done which showed urticaria.  -------------------------------------------------------------------------------------------------------  10/6/24 - 10/12/24: Hospitalized at [facility] Complained of unremitting fevers and night sweats. Found have persistent campylobacter fetus bacteremia and admitted to [facility] from 10/6/24 to 10/12/24. He received IV meropenem via midline for 4 weeks (10/6-11/3), with successful resolution of fevers. Gallium scan was negative, with source presumed to be translocation from GI tract. Cardiology was consulted for percarditis, and he was started on colchicine, which he is still taking. Was discharged to home with ID follow up, symptoms have resolved following completion of meropenem.  [de-identified] : Here for follow up. At last visit, was complaining of unremitting fevers and night sweats. He was found have campylobacter fetus bacteremia and admitted to [facility] from 10/6/24 to 10/12/24. He received IV meropenem via midline for 4 weeks (10/6-11/3), with successful resolution of fevers. Gallium scan was negative, with source presumed to be translocation from GI tract. Cardiology was consulted for percarditis, and he was started on colchicine, which he is still taking.  Upon discharge, he was evaluated by GI, and was started on cholestyramine for diarrhea. Stool studies were negative for c. diff, however, GI PCR x2 was indeterminate for norovirus. Given treatment was supportive, he has been managing his diarrhea with cholestyramine to present day.  Today, he reports feeling much improved. His only complaint is persistent diarrhea, which he describes as 3-4 bowel movements daily, of varying consistency. He notes formed stools when he takes cholesytramine, but states he is dependent on it. Otherwise, he notes full resolution of fevers, night sweats, chest pain, pericarditis symptoms. He also endorses dry skin of his bilateral hands with areas of cracked skin, for which he is applying triamcinolone and emollients.

## 2024-12-23 NOTE — REVIEW OF SYSTEMS
[Chest Pain] : chest pain [Abdominal Pain] : abdominal pain [Skin Rash] : skin rash [Fever] : no fever [Chills] : no chills [Night Sweats] : no night sweats [Fatigue] : no fatigue [Eye Pain] : no eye pain [Vision Problems] : no vision problems [Dysphagia] : no dysphagia [Odynophagia] : no odynophagia [Palpitations] : no palpitations [Lower Ext Edema] : no lower extremity edema [Shortness Of Breath] : no shortness of breath [Wheezing] : no wheezing [Cough] : no cough [Vomiting] : no vomiting [Dysuria] : no dysuria [Joint Pain] : no joint pain [Fainting] : no fainting [Depression] : no depression [Muscle Weakness] : no muscle weakness [Easy Bleeding] : no tendency for easy bleeding [Easy Bruising] : no tendency for easy bruising [FreeTextEntry7] : +diarrhea [de-identified] : +xerosis of fingers

## 2024-12-23 NOTE — REVIEW OF SYSTEMS
[Chest Pain] : chest pain [Abdominal Pain] : abdominal pain [Skin Rash] : skin rash [Fever] : no fever [Chills] : no chills [Night Sweats] : no night sweats [Fatigue] : no fatigue [Eye Pain] : no eye pain [Vision Problems] : no vision problems [Dysphagia] : no dysphagia [Odynophagia] : no odynophagia [Palpitations] : no palpitations [Lower Ext Edema] : no lower extremity edema [Shortness Of Breath] : no shortness of breath [Wheezing] : no wheezing [Cough] : no cough [Vomiting] : no vomiting [Dysuria] : no dysuria [Joint Pain] : no joint pain [Fainting] : no fainting [Depression] : no depression [Muscle Weakness] : no muscle weakness [Easy Bleeding] : no tendency for easy bleeding [Easy Bruising] : no tendency for easy bruising [FreeTextEntry7] : +diarrhea [de-identified] : +xerosis of fingers

## 2024-12-23 NOTE — ASSESSMENT
[FreeTextEntry1] : Gurdeep Messer is a 60 year old man with CLL/SLL. First treated in 2016 for extensive/symptomatic lymphadenopathy with bendamustine/rituximab. Progressive disease in 2018. Started Ibrutinib and has had an excellent response.  Plan: 1. CLL/SLL --CT C/A/P (7/3/24) without any evidence for progressive CLL/SLL or transformation --continue ibrutinib 420mg PO daily --reviewed bleeding precautions - need to notify office in anticipation of any surgeries so drug can be held --indefinite treatment planned --check FISH at time of progression  2. hx c. fetus bacteremia [RESOLVED] Patient complaining of diarrhea dating back to June. Was hospitalized at [facility] in August 2024, found to have campylobacter fetus bacteremia. Was treated with Unasyn x10 days and discharged home on cholestyramine and loperamide. Bcx were repeated in 10/2024, showing persistent c. fetus bacteremia. He was treated with 4 weeks of meropenem with successful clearance of bacteremia. --resolved infection, no further interventions required  3. hx DVT, PE --recurrent provoked events but he has been advised by previous hematologist Dr. Winslow to continue long term Eliquis indefinitely. He is tolerating w/o any bleeding concerns on 2.5 mg po BID. advised to continue at this time. --continue Eliquis 2.5mg BID  4. hypogammaglobulinemia --related to CLL/SLL and its treatment --goal for CLL/SLL pt would be to maintain IgG level > 500.  --he has had persistently low IgG levels and has had recurrent infections. Follows with immunologist (Dr. Cruz). --continues on monthly home IVIG, last on 8/31/24  (SQ formulation not covered by insurance) --IgG levels through Dr. Cruz, next follow up is 12/23/24  5. pericarditis --hospitalized in 8/2024, found to have small pericardial effusion at that time --previously also on ASA for symptoms, discontinued by cardiology given bleeding risk on concurrent Eliquis --has been on colchicine, plan per cardiology is for 6 months of therapy (ending 2/2025)  6. diarrhea, norovirus (indeterminate GI PCR) --evaluated by infectious disease (Dr. Pearl); supportive care --continue cholestyramine and imodium per GI --colchicine may be contributing to diarrhea, will monitor for symptoms following completion  RTC in 4 months Labs- CBC, CMP, LDH  Please see attending physician attestation below.

## 2024-12-23 NOTE — HISTORY OF PRESENT ILLNESS
[de-identified] : Mr Gurdeep Messer is a 60 year old male here for f/u of CLL/SLL.  Hematologic/Oncologic history: 1.  CLL/SLL --pt reports original dx in [place] in the mid 1990s.  watchful waiting --presented with extensive lymphadenopathy in 2016.  excisional biopsy confirmed SLL.  FISH with del(11q).  Treated by Dr Winslow with bendamustine/rituximab x 6 cycles --attained response to BR but lasted < 1 year --progressive, bulky adenopathy in 2018.  Started Ibrutinib.  2.  hypogammaglobulinemia --likely due to CLL/SLL and treatment --has had severe infections - including a prolonged and severe bout with Covid-19.   Campylobacter bacteremia/sepsis in Summer 2024. --started IVIG Summer 2024  3.  recurrent DVT/PE --first in 2016 PICC line associated during chemotherapy --then recurrent pulmonary embolism in 2021 associated with covid-19 --long term eliquis 2.5 mg po BID --hx of weak positive anticardiolipin antibody but not fulfilling criteria for APLAS.  MTHFR mutation of questionable significance.  PMH, PSH reviewed and updated in [facility] -   - . lives in [place].  Works for [employer] in [place].  former smoker.  originally from [place].  -------------------------------------------------------------------------------------------------------  8/22/24 - 8/13/24: Hospitalized at [facility]- At the end of July, he developed progressive chest pain and proceeded to the ER, where he was found to have evidence of a small pericardial effusion and pericarditis. CTA chest was negative for PE. - For his pleuritic chest pain, he was found to have pericarditis and discharged on colchicine and ASA. - For his diarrhea, he was found to have campylobacter fetus bacteremia. He was treated with 10 days of Unasyn. EGD/colonoscopy were grossly unyielding. He was discharged on cholestyramine and loperamide. - For his known hypogammoglobulinemia, he received IVIG on 8/3/24 (5g) and 8/4/24 (30g). - For his rash, a punch biopsy was done which showed urticaria.  -------------------------------------------------------------------------------------------------------  10/6/24 - 10/12/24: Hospitalized at [facility] Complained of unremitting fevers and night sweats. Found have persistent campylobacter fetus bacteremia and admitted to [facility] from 10/6/24 to 10/12/24. He received IV meropenem via midline for 4 weeks (10/6-11/3), with successful resolution of fevers. Gallium scan was negative, with source presumed to be translocation from GI tract. Cardiology was consulted for percarditis, and he was started on colchicine, which he is still taking. Was discharged to home with ID follow up, symptoms have resolved following completion of meropenem.  [de-identified] : Here for follow up. At last visit, was complaining of unremitting fevers and night sweats. He was found have campylobacter fetus bacteremia and admitted to [facility] from 10/6/24 to 10/12/24. He received IV meropenem via midline for 4 weeks (10/6-11/3), with successful resolution of fevers. Gallium scan was negative, with source presumed to be translocation from GI tract. Cardiology was consulted for percarditis, and he was started on colchicine, which he is still taking.  Upon discharge, he was evaluated by GI, and was started on cholestyramine for diarrhea. Stool studies were negative for c. diff, however, GI PCR x2 was indeterminate for norovirus. Given treatment was supportive, he has been managing his diarrhea with cholestyramine to present day.  Today, he reports feeling much improved. His only complaint is persistent diarrhea, which he describes as 3-4 bowel movements daily, of varying consistency. He notes formed stools when he takes cholesytramine, but states he is dependent on it. Otherwise, he notes full resolution of fevers, night sweats, chest pain, pericarditis symptoms. He also endorses dry skin of his bilateral hands with areas of cracked skin, for which he is applying triamcinolone and emollients.

## 2024-12-23 NOTE — HISTORY OF PRESENT ILLNESS
[de-identified] : Mr Gurdeep Messer is a 60 year old male here for f/u of CLL/SLL.  Hematologic/Oncologic history: 1.  CLL/SLL --pt reports original dx in [place] in the mid 1990s.  watchful waiting --presented with extensive lymphadenopathy in 2016.  excisional biopsy confirmed SLL.  FISH with del(11q).  Treated by Dr Winslow with bendamustine/rituximab x 6 cycles --attained response to BR but lasted < 1 year --progressive, bulky adenopathy in 2018.  Started Ibrutinib.  2.  hypogammaglobulinemia --likely due to CLL/SLL and treatment --has had severe infections - including a prolonged and severe bout with Covid-19.   Campylobacter bacteremia/sepsis in Summer 2024. --started IVIG Summer 2024  3.  recurrent DVT/PE --first in 2016 PICC line associated during chemotherapy --then recurrent pulmonary embolism in 2021 associated with covid-19 --long term eliquis 2.5 mg po BID --hx of weak positive anticardiolipin antibody but not fulfilling criteria for APLAS.  MTHFR mutation of questionable significance.  PMH, PSH reviewed and updated in [facility] -   - . lives in [place].  Works for [employer] in [place].  former smoker.  originally from [place].  -------------------------------------------------------------------------------------------------------  8/22/24 - 8/13/24: Hospitalized at [facility]- At the end of July, he developed progressive chest pain and proceeded to the ER, where he was found to have evidence of a small pericardial effusion and pericarditis. CTA chest was negative for PE. - For his pleuritic chest pain, he was found to have pericarditis and discharged on colchicine and ASA. - For his diarrhea, he was found to have campylobacter fetus bacteremia. He was treated with 10 days of Unasyn. EGD/colonoscopy were grossly unyielding. He was discharged on cholestyramine and loperamide. - For his known hypogammoglobulinemia, he received IVIG on 8/3/24 (5g) and 8/4/24 (30g). - For his rash, a punch biopsy was done which showed urticaria.  -------------------------------------------------------------------------------------------------------  10/6/24 - 10/12/24: Hospitalized at [facility] Complained of unremitting fevers and night sweats. Found have persistent campylobacter fetus bacteremia and admitted to [facility] from 10/6/24 to 10/12/24. He received IV meropenem via midline for 4 weeks (10/6-11/3), with successful resolution of fevers. Gallium scan was negative, with source presumed to be translocation from GI tract. Cardiology was consulted for percarditis, and he was started on colchicine, which he is still taking. Was discharged to home with ID follow up, symptoms have resolved following completion of meropenem.  [de-identified] : Here for follow up. At last visit, was complaining of unremitting fevers and night sweats. He was found have campylobacter fetus bacteremia and admitted to [facility] from 10/6/24 to 10/12/24. He received IV meropenem via midline for 4 weeks (10/6-11/3), with successful resolution of fevers. Gallium scan was negative, with source presumed to be translocation from GI tract. Cardiology was consulted for percarditis, and he was started on colchicine, which he is still taking.  Upon discharge, he was evaluated by GI, and was started on cholestyramine for diarrhea. Stool studies were negative for c. diff, however, GI PCR x2 was indeterminate for norovirus. Given treatment was supportive, he has been managing his diarrhea with cholestyramine to present day.  Today, he reports feeling much improved. His only complaint is persistent diarrhea, which he describes as 3-4 bowel movements daily, of varying consistency. He notes formed stools when he takes cholesytramine, but states he is dependent on it. Otherwise, he notes full resolution of fevers, night sweats, chest pain, pericarditis symptoms. He also endorses dry skin of his bilateral hands with areas of cracked skin, for which he is applying triamcinolone and emollients.

## 2024-12-23 NOTE — PHYSICAL EXAM
[Fully active, able to carry on all pre-disease performance without restriction] : Status 0 - Fully active, able to carry on all pre-disease performance without restriction [Normal] : affect appropriate [de-identified] : EOMI, no conjunctival injection, anicteric [de-identified] : supple [de-identified] : No calf tenderness or lower extremity swelling. [de-identified] : No palpable cervical, supraclavicular, axillary, or inguinal lymphadenopathy. [de-identified] : +xerosis with few <1cm areas of cracked skin, no warmth, purulence or fluctuance

## 2024-12-23 NOTE — ASSESSMENT
[FreeTextEntry1] : Gurdeep Messer is a 60 year old man with CLL/SLL. First treated in 2016 for extensive/symptomatic lymphadenopathy with bendamustine/rituximab. Progressive disease in 2018. Started Ibrutinib and has had an excellent response.  Plan: 1. CLL/SLL --CT C/A/P (7/3/24) without any evidence for progressive CLL/SLL or transformation --continue ibrutinib 420mg PO daily --reviewed bleeding precautions - need to notify office in anticipation of any surgeries so drug can be held --indefinite treatment planned --check FISH at time of progression  2. hx c. fetus bacteremia [RESOLVED] Patient complaining of diarrhea dating back to June. Was hospitalized at [facility] in August 2024, found to have campylobacter fetus bacteremia. Was treated with Unasyn x10 days and discharged home on cholestyramine and loperamide. Bcx were repeated in 10/2024, showing persistent c. fetus bacteremia. He was treated with 4 weeks of meropenem with successful clearance of bacteremia. --resolved infection, no further interventions required  3. hx DVT, PE --recurrent provoked events but he has been advised by previous hematologist Dr. Winslow to continue long term Eliquis indefinitely. He is tolerating w/o any bleeding concerns on 2.5 mg po BID. advised to continue at this time. --continue Eliquis 2.5mg BID  4. hypogammaglobulinemia --related to CLL/SLL and its treatment --goal for CLL/SLL pt would be to maintain IgG level > 500.  --he has had persistently low IgG levels and has had recurrent infections. Follows with immunologist (Dr. Cruz). --continues on monthly home IVIG, last on 8/31/24  (SQ formulation not covered by insurance) --IgG levels through Dr. Cruz, next follow up is 12/23/24  5. pericarditis --hospitalized in 8/2024, found to have small pericardial effusion at that time --previously also on ASA for symptoms, discontinued by cardiology given bleeding risk on concurrent Eliquis --has been on colchicine, plan per cardiology is for 6 months of therapy (ending 2/2025)  6. diarrhea, norovirus (indeterminate GI PCR) --evaluated by infectious disease (Dr. Pearl); supportive care --continue cholestyramine and imodium per GI --colchicine may be contributing to diarrhea, will monitor for symptoms following completion  RTC in 4 months Labs- CBC, CMP, LDH  Please see attending physician attestation below.

## 2024-12-23 NOTE — ASSESSMENT
[FreeTextEntry1] : Gurdeep Messer is a 60 year old man with CLL/SLL. First treated in 2016 for extensive/symptomatic lymphadenopathy with bendamustine/rituximab. Progressive disease in 2018. Started Ibrutinib and has had an excellent response.  Plan: 1. CLL/SLL --CT C/A/P (7/3/24) without any evidence for progressive CLL/SLL or transformation --continue ibrutinib 420mg PO daily --reviewed bleeding precautions - need to notify office in anticipation of any surgeries so drug can be held --indefinite treatment planned --check FISH at time of progression  2. hx c. fetus bacteremia [RESOLVED] Patient complaining of diarrhea dating back to June. Was hospitalized at [facility] in August 2024, found to have campylobacter fetus bacteremia. Was treated with Unasyn x10 days and discharged home on cholestyramine and loperamide. Bcx were repeated in 10/2024, showing persistent c. fetus bacteremia. He was treated with 4 weeks of meropenem with successful clearance of bacteremia. --resolved infection, no further interventions required  3. hx DVT, PE --recurrent provoked events but he has been advised by previous hematologist Dr. Winslow to continue long term Eliquis indefinitely. He is tolerating w/o any bleeding concerns on 2.5 mg po BID. advised to continue at this time. --continue Eliquis 2.5mg BID  4. hypogammaglobulinemia --related to CLL/SLL and its treatment --goal for CLL/SLL pt would be to maintain IgG level > 500.  --he has had persistently low IgG levels and has had recurrent infections. Follows with immunologist (Dr. Cruz). --continues on monthly home IVIG, last on 8/31/24  (SQ formulation not covered by insurance) --IgG levels through Dr. Cruz, next follow up is 12/23/24  5. pericarditis --hospitalized in 8/2024, found to have small pericardial effusion at that time --previously also on ASA for symptoms, discontinued by cardiology given bleeding risk on concurrent Eliquis --has been on colchicine, plan per cardiology is for 6 months of therapy (ending 2/2025)  6. diarrhea, norovirus (indeterminate GI PCR) --evaluated by infectious disease (Dr. Pearl); supportive care --continue cholestyramine and imodium per GI --colchicine may be contributing to diarrhea, will monitor for symptoms following completion  RTC in 4 months Labs- CBC, CMP, LDH  Please see attending physician attestation below.

## 2024-12-23 NOTE — PHYSICAL EXAM
[Alert] : alert [Well Nourished] : well nourished [Healthy Appearance] : healthy appearance [No Acute Distress] : no acute distress [Well Developed] : well developed [Normal Voice/Communication] : normal voice communication [No Discharge] : no discharge [No Photophobia] : no photophobia [Sclera Not Icteric] : sclera not icteric [Conjunctival Erythema] : no conjunctival erythema [Normal TMs] : both tympanic membranes were normal [Normal Nasal Mucosa] : the nasal mucosa was normal [Normal Lips/Tongue] : the lips and tongue were normal [Normal Outer Ear/Nose] : the ears and nose were normal in appearance [No Thrush] : no thrush [Pale mucosa] : no pale mucosa [Boggy Nasal Turbinates] : no boggy and/or pale nasal turbinates [Pharyngeal erythema] : no pharyngeal erythema [Posterior Pharyngeal Cobblestoning] : posterior pharyngeal cobblestoning [Exudate] : no exudate [Supple] : the neck was supple [Normal Rate and Effort] : normal respiratory rhythm and effort [No Crackles] : no crackles [Bilateral Audible Breath Sounds] : bilateral audible breath sounds [Wheezing] : no wheezing was heard [Normal Rate] : heart rate was normal  [Normal S1, S2] : normal S1 and S2 [Regular Rhythm] : with a regular rhythm [Soft] : abdomen soft [Not Tender] : non-tender [No HSM] : no hepato-splenomegaly [Normal Cervical Lymph Nodes] : cervical [No Rash] : no rash [Urticaria] : no urticaria [No clubbing] : no clubbing [No Cyanosis] : no cyanosis [Normal Mood] : mood was normal [Normal Affect] : affect was normal [Alert, Awake, Oriented as Age-Appropriate] : alert, awake, oriented as age appropriate [de-identified] : hands, including pals- xerosis with scaling

## 2024-12-23 NOTE — HISTORY OF PRESENT ILLNESS
[de-identified] : DANITA MARC is a 61 year old male with CLL/SLL, PMH of PE, DVT, subclinical hypothyroidism, ascending aortic aneurism, s/p Rituximab, now on Imbruvica, returns for follow up. = s/p Rituxan (total of 6 doses) and Bendamustine (completed 1/2017), = on Imbruvica since May 2018. = former smoker (8 pack year hx) = on immunoglobulin replacement therapy (IGRT) since 7/2024: Gamunex 35g q 4 weeks, increased to 35g q3 weeks (588 mg/kg/month) since 11/2023.  = Initial immune labs: --- s/p PCV13 6/2020 and PPSV 10/21, no response to vaccinations; Strep pneumo 11/2021= +3/20 --- 6/2021- protective tetanus- 0.91 --- 12/2022- normal lymphocyte immunophenotype; --- 12/2022- B-cell phenotyping, [place]: decreased CD20+, normal CD19+, predominantly memory B-cells-CD27+CD19+ (94.3%)  12/23/24: - recently saw hematology Dr KERR: continue ibrutinib  - Recurrent/persistent C.fetus bacteremia and Pericarditis - resolved;, S/p 4 weeks of meropenem (EOT 11/3) with significant improvement/resolution in all symptoms. However, since stopping meropenem his diarrhea has returned.: 3-4 times a day loose stools Thought to be noravirus. - currently on cholestyramine - On Gamunex 35g q 3 weeks (588 mg/kg/month)) since 112023 (was on q4 weeks prior to that);  IgG-725-prior to increasing IVIG to q3 weeks - his cough is gone; exercise endurance approved. On Wixela 250 bid since end of october 2024. He still has phlegm but feels it is more in his sinuses.  - uses nasal Olopatadine, not sure if it helps with mucous. He had negative environmental skin testing in 2019.  - Lat seen by ENT in 2019-has Dx of  Chronic Rhinosinusitis . I dont see any sinus CT - Denies heartburn; he has been on omeprazole since? 2018, not sure if he had GERD symptoms then  10/21/24: - 8/22/24 - 8/13/24: Hospitalized at [facility]- received 1st dose of immunoglobulin replacement therapy (IGRT):  Gamunex 35g From hem note: --- At the end of July, he developed progressive chest pain and proceeded to the ER, where he was found to have evidence of a small pericardial effusion and pericarditis. CTA chest was negative for PE. --- For his pleuritic chest pain, he was found to have pericarditis and discharged on colchicine and ASA. --- For his diarrhea, he was found to have campylobacter fetus bacteremia. He was treated with 10 days of Unasyn. EGD/colonoscopy were grossly unyielding. He was discharged on cholestyramine and loperamide. --- For his known hypogammoglobulinemia, he received IVIG on 8/3/24 (5g) and 8/4/24 (30g). - For his rash, a punch biopsy was done which showed urticaria. --- rash resolved  - 9/2024- recurrence of Campylobacter fetus bacteremia, started on Meropenem  - [facility] for Gamunex-C (ins. preferred product) 35 grams IV every 4 weeks with acetaminophen and diphenhydramine pre-medication.  - [place] labs from 10/14/24:  --- IgG- 815, IgA-71, IgM- 61; labs are 2 weeks post-IVIG; last IVIG-9/30/24 - On Meropenem since 10/6/24 - Overall feels better on Meropenem: diarrhea resolved; still has residual cough, postnasal drip and SOB on exertion - He used to see Dr Chau for cough after completing rituximab, was on symbicort at the time   7/3/2024 The patient, would like to start IVIG sub q.  Last month he was in [place], developed swollen  arms, lower extremity and abdominal area. DVT was r/o. PMD, placed the patient on oral steroid and antibiotic. Swelling subsided within few days.    Today, had chest, abdominal and pelves CT- by Hematogist.  -Currently, feeling well.  1/11/24: - received Prevnar 20 11/28/24 in AM, ~ 12 hours later developed chills, crampy abdominal pain with several bouts of non-bloody diarrhea, lethargy around 8PM. He called EMS however pt declined to go to the ER. Just took Acetaminophen (Tylenol). He never had vaccine reactions or similar symptoms - 12/26/23- developed symptoms of COVID-19, we spoke 12/28/23 after he tested positive, remdesivir was ordered and he received 3 doses. He states that he felt much better after remdesivir. Still has some headaches and cough. He hasnt used any inhalers, but does have albuterol at home. He is not sure who prescribed it and why.   11/27/23: No new complains and no interval problems. He may have needed 1 course of antibiotics over the summer, but not sure. He feels well. He had severe COVID-19 pneumonia in 2021, also diagnosed with PE, received Remdisivir, required O2 up to 60%. He complains of chronic postnasal drip and nasal congestion, denies colored nasal discharge He started seeing new hematologist, Dr Gibbs  6/19/23: He notes that on Friday night he swallowed a handful of pills which he normally does without issue. He feels like one of the pills is stuck in his windpipe. He has chest pain that is worse when he eats. He feels an "air buildup" behind it, has burping. No shortness of breath. He does have cough due to the sensation of "mucus building up behind the pill." He has not sought care for this.  He had a viral illness at the end of May and reports that he had fevers, persistent cough for a month. He was started on singulair which got rid of his cough.  Previous visit Dec 2022: Last visit was in June 2022. Since then he has been infection-free, no interval abx use. He states he had gross hematuria back in August. CT A/P was performed which showed bladder wall thickening, possible cystitis. Hematuria self-resolved, no antibiotics use. He is a former smoker and has not seen urology yet.  Traction bronchiectasis was seen on CT in April. He did have COVID previously. Breathing is okay. Complains of fatigue.  = received Moderna COVID-19 vaccine #4: 4/2021, 5/2021, 8/2021, 2/2022, 11/2022. had COVID-19 disease 1/2021, hospitalized for 2 weeks due to SOB, was offered ICU due to hypoxia but refused. He was on O2 after he = got home for 4 months.   HISTORY: - He injured his left foot in August 2021. He reports that he went into work wearing dress shoes, first time in a long time. When he came home, he had pain in his left foot. He did not go see a doctor for about a month, but was eventually diagnosed with multiple fractures and now is in a cast. Per patient, it is healing slowly but as expected. He's had congestion and cough for about 1 week. COVID test was negative. He is on his last day of Zpack today. Denies other illnesses or infections. Of note, patient reports that he is on almost daily saline nasal rinses and Mucinex chronically. He wants IGRT to be the last resort. He received Pneumonvax on 10/1/21 but did not have antibody response. He is still on Imbruvica. Only uses Fluticasone and Symbicort occasionally.   - admitted to [facility] 2/21 for COVID-19 pneumonia, also diagnosed with PE, received Remdisivir, required O2 up to 60%, not intubated. He had COVID-19 antibodies when he left the hospital but none by 4/2021. He still uses some O2 when works out, but overall he feels significantly better, especially in the last few weeks. - He received his 2nd Moderna vaccine 5/2021. - No other interval illnesses. or infections.  = He was found to have low quantitative immunoglobulins IgG-576, IgA-82, IgM-16, protective tetanus and low diphtheria, S.Pneumonie titers after receiving prevnar in 2016 during chemo treatment and PPSV 3/2018.  = For CLL he was treated with Rituxan (total of 6 doses) and Bendamustine (completed 1/2017), currently on Imbruvica ( since May 2018.  Interval History: - 12/15/20- Continues to feel well. He intentionally lost a lot of weight: he walks >100 ml a week, eats healthier, works from home. No interval issues. Has some Imbruvica side effects: GI, dry skin, nails but they are tolerable and he is happy with control of his CLL. He uses nasal fluticasone occasionally. = Spirometry 12/8/20 reviewed: normal = No response to PCV 6/2020; s/p PPSV23 in 2018  - 6/2020- He has been doing very well during pandemic. He has been working from home and feels that the stress of 4 hour commute a day is gone and now he is able to walk daily, eats healthier, sleeps better. He lost weight (on purpose) and feels healthier. No illnesses since 11/2019. No sick contacts, including COVID. He has been consistent with Imbruvica ad develops LAP every time he tries to stop it. - 11/2019- had fever to 103.8 with severe diarrhea necessitating ED visit and Azithromycin course. Diarrhea lasted 3 weeks and did not determine the cause. It happened a few days after getting the flu shot. Also has continued persistent nasal congestion that is treated with sinus rinses and Nasonex but has improved from previously. Previously, we recommended starting IGRT therapy but patient has been resistant to starting this treatment. He is here to follow up on labs and discuss this further.  History of infections: 11/2018- sick for over 3 months with sinus infection, C/S by Dr Casas: HIB, Staph lucdusensis. He was treated with a long course of Amoxicillin/Clavulanate Potassium (Augmentin) and Prednisone 2001- pneumonia b/l, required hospitalization He always had sinus issues, he had his nose broken many times playing soccer, s/p 2 rhinoplasties 30 years ago.

## 2024-12-23 NOTE — PHYSICAL EXAM
[Fully active, able to carry on all pre-disease performance without restriction] : Status 0 - Fully active, able to carry on all pre-disease performance without restriction [Normal] : affect appropriate [de-identified] : EOMI, no conjunctival injection, anicteric [de-identified] : supple [de-identified] : No calf tenderness or lower extremity swelling. [de-identified] : No palpable cervical, supraclavicular, axillary, or inguinal lymphadenopathy. [de-identified] : +xerosis with few <1cm areas of cracked skin, no warmth, purulence or fluctuance

## 2025-01-27 NOTE — ASSESSMENT
[FreeTextEntry1] : He feels well at present and denies new complaints. I recommended that we continue cholestyramine as needed. No endoscopic testing warranted at present. Continue omeprazole for GERD.  Routine follow up in 6 months recommended.

## 2025-01-27 NOTE — PHYSICAL EXAM
[Alert] : alert [Normal Voice/Communication] : normal voice/communication [Healthy Appearing] : healthy appearing [No Acute Distress] : no acute distress [Sclera] : the sclera and conjunctiva were normal [Hearing Threshold Finger Rub Not [place]] : hearing was normal [Normal Lips/Gums] : the lips and gums were normal [Oropharynx] : the oropharynx was normal [Normal Appearance] : the appearance of the neck was normal [No Neck Mass] : no neck mass was observed [No Respiratory Distress] : no respiratory distress [No Acc Muscle Use] : no accessory muscle use [Respiration, Rhythm And Depth] : normal respiratory rhythm and effort [Auscultation Breath Sounds / Voice Sounds] : lungs were clear to auscultation bilaterally [Heart Rate And Rhythm] : heart rate was normal and rhythm regular [Normal S1, S2] : normal S1 and S2 [Murmurs] : no murmurs [Bowel Sounds] : normal bowel sounds [Abdomen Tenderness] : non-tender [No Masses] : no abdominal mass palpated [Abdomen Soft] : soft [] : no hepatosplenomegaly [No CVA Tenderness] : no CVA  tenderness [Involuntary Movements] : no involuntary movements were seen [Normal Color / Pigmentation] : normal skin color and pigmentation [No Focal Deficits] : no focal deficits [Oriented To Time, Place, And Person] : oriented to person, place, and time

## 2025-01-27 NOTE — HISTORY OF PRESENT ILLNESS
[FreeTextEntry1] : Feels diarrhea improving. Takes less cholestyramine at present. Not taking diphenoxylate now. Had PCR testing indeterminate for norovirus in December. He denies rectal bleeding, abdominal pain, n/v. Appetite fair. Takes omeprazole for GERD.

## 2025-02-03 NOTE — HISTORY OF PRESENT ILLNESS
[FreeTextEntry1] : Mr. MARC is a 60 year male presenting for management of acute pericarditis. Here with his gibran Cowan.   Notable PMHx: - History of Incessant Pericarditis 2/2 campylobacter fetus (dx in 8/2024) - Ascending Aortic Aneurysm (4.1 cm by SOCRATES in 10/2024) --- follows with Dr. 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 Later developed likely norovirus for which he is continuing to recover from as a result of his immunocompromised state. Stopped colchicine 2 months ago. Is having rib pain as a result of a fall on ice. No recurrence of pericarditis like symptoms.   11/1/24 Blood cx ordered at last visit came back with campylobacter. Eventually presented to [facility] for management he was seen by ID, cards, and onc. He underwent SOCRATES and PET/CT that showed no evidence of endocarditis or focus for his campylobacter fetus. Thought to be GI translocation in setting of immunocompromised state. He was sent home with a midline and meropenem x 4 weeks. Since discharge, no chest pain. He has quite a bit phlegm and secretions but otherwise no fevers. Rare night sweats and diarrhea, much better since discharge. No rashes  9/26/24 TTE showed resolution of pericardial effusion. He reports interval worsening of his chest pain and has developed night sweats as well. He reports a small circular erythematous rashes on his abdomen that were similar in appearance to when he first initially presented. He had a low grade temp to 100F last week but otherwise has not had any fevers. Notes diarrhea that has been worsening since discharge. Otherwise no recent travel, sick contacts, N/V. He was seen by his oncologist yesterday who recommended he follow-up with ID and he is currently awaiting an appointment for this.   Visit 9/12/24: Since last visit, his pain has improved but is still present. Though he notes the pain is actually better when laying flat changed from prior. He did have some constant pain in the same area that resolved with one dose of tylenol. No bleeding issues while on high dose ASA. No dyspnea.  Visit 9/5/2024: Here for follow up after hospital admission at [facility] 8/2-8/13  for multiple symptoms including diarrhea, fevers, abdominal distention, rash, and chest pain. Found to have diffuse ST elevation, ESR 69, , and TTE with normal biventricular function and a small pericardial effusion.  The differential for his pericarditis was ibrutinib related though he had been on this for 7 years and campylobacter bacteremia. He was treated with abx and ASA, colchicine, and PPI for pericarditis. Since discharge his pain was initially improving but began to recur since he had IVIG on Saturday. He reports the same positional, pleuritic chest pain though of significantly lesser severity than prior but increased when compared to immeidately prior to IVIG. He experiences dyspnea after walking his dogs about 3 miles though this was occurring well prior to his presentation. His pain is not exertional. Increased ASA dose with close follow-up and repeat TTE  Prior Data Review Relevant Labs - ESR: 73 (7/24/24) --> 69 (8/2/24) --> 73 (9/12/24) --> 29 (10/7/224) --> <3 (11/22/24) - CRP: 140 (7/24/24) --> 247 (8/2/24) --> 61 (9/12/24) --> 5.1 (10/7/24) --> 6 (12/3/24) EKG - 2/3/25: NSR. Normal EKG.  - 11/1/24: SR. Low voltage. nonspecific ST/T wave abnormality.  - 9/26/2024. SR. Anterior TWI. - 9/11/2024: SR. Nonspecific T wave abnormality. - 9/5/2024: SR. Diffuse TWI likely representing resolution of pericarditis. Compared to prior EKG on 8/3/2024 the TWI are new and ST elevations have resolved. Echo - 10/9/24 SOCRATES: mild MR, mild TR, mildly dilated aortic root 4.1, no vegetations - 10/7/24 TTE: nl LV and RV, no sig valve disease, no effusion, root mildly dilated 4.2 - 9/2024 TTE: LVEF 55-60%, nl RV, mild TR, dilated aortic root 4.1cm, dilated ascending aorta (4.0m), no pericardial effusion - 8/2024 TTE: LVEF 54%, no rwma, g1dd, mildly enlarged RV with normal size, trace pericardial effusion.  Cath - no prior Stress Test - no prior Other - 8/2024 CTPE: noted coronary artery calcifications  - 4/2022 CTA Chest Aorta: Root 4.1cm, ascending aorta 3.8cm (stable since 4/2021)

## 2025-02-03 NOTE — PHYSICAL EXAM
[TextEntry] : General: NAD Cardiac: nl s1s2, RRR, no mrg, no JVD, no peripheral edema Lungs: CTAB. Normal respiratory effort Vascular: lower extremities warm/well perfused Neuro: moves all extremities Psych: normal affect, normal mood
[TextEntry] : General: NAD Cardiac: nl s1s2, RRR, no mrg, no JVD, no peripheral edema Lungs: CTAB. Normal respiratory effort Vascular: lower extremities warm/well perfused Neuro: moves all extremities Psych: normal affect, normal mood
No

## 2025-02-03 NOTE — DISCUSSION/SUMMARY
[FreeTextEntry1] : Here for follow-up for following incessant pericarditis with normalization of inflammatory markers. Currently dealing with norovirus.   # Incessant Pericarditis 2/2 Campylobacter Fetus Bacteremia, resolved  - off ASA and colchicine - inflammatory markers normalized - EKG wnl   # Dilated Aortic Root # Ascending Aortic Aneurysm - follows with Dr. Gracia - TTE in 2026 to monitor for AI  # HLD - check lipid panel, lp(a), vitamin D 1-2 days prior to next appt   # Incidental CACS - will discuss at future visit  RTC in 10/2025 or sooner PRN. [EKG obtained to assist in diagnosis and management of assessed problem(s)] : EKG obtained to assist in diagnosis and management of assessed problem(s)

## 2025-02-03 NOTE — HISTORY OF PRESENT ILLNESS
[FreeTextEntry1] : Mr. MARC is a 60 year male presenting for management of acute pericarditis. Here with his gibran Cowan.   Notable PMHx: - History of Incessant Pericarditis 2/2 campylobacter fetus (dx in 8/2024) - Ascending Aortic Aneurysm (4.1 cm by SOCRATES in 10/2024) --- follows with Dr. 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 Later developed likely norovirus for which he is continuing to recover from as a result of his immunocompromised state. Stopped colchicine 2 months ago. Is having rib pain as a result of a fall on ice. No recurrence of pericarditis like symptoms.   11/1/24 Blood cx ordered at last visit came back with campylobacter. Eventually presented to [facility] for management he was seen by ID, cards, and onc. He underwent SOCRATES and PET/CT that showed no evidence of endocarditis or focus for his campylobacter fetus. Thought to be GI translocation in setting of immunocompromised state. He was sent home with a midline and meropenem x 4 weeks. Since discharge, no chest pain. He has quite a bit phlegm and secretions but otherwise no fevers. Rare night sweats and diarrhea, much better since discharge. No rashes  9/26/24 TTE showed resolution of pericardial effusion. He reports interval worsening of his chest pain and has developed night sweats as well. He reports a small circular erythematous rashes on his abdomen that were similar in appearance to when he first initially presented. He had a low grade temp to 100F last week but otherwise has not had any fevers. Notes diarrhea that has been worsening since discharge. Otherwise no recent travel, sick contacts, N/V. He was seen by his oncologist yesterday who recommended he follow-up with ID and he is currently awaiting an appointment for this.   Visit 9/12/24: Since last visit, his pain has improved but is still present. Though he notes the pain is actually better when laying flat changed from prior. He did have some constant pain in the same area that resolved with one dose of tylenol. No bleeding issues while on high dose ASA. No dyspnea.  Visit 9/5/2024: Here for follow up after hospital admission at [facility] 8/2-8/13  for multiple symptoms including diarrhea, fevers, abdominal distention, rash, and chest pain. Found to have diffuse ST elevation, ESR 69, , and TTE with normal biventricular function and a small pericardial effusion.  The differential for his pericarditis was ibrutinib related though he had been on this for 7 years and campylobacter bacteremia. He was treated with abx and ASA, colchicine, and PPI for pericarditis. Since discharge his pain was initially improving but began to recur since he had IVIG on Saturday. He reports the same positional, pleuritic chest pain though of significantly lesser severity than prior but increased when compared to immeidately prior to IVIG. He experiences dyspnea after walking his dogs about 3 miles though this was occurring well prior to his presentation. His pain is not exertional. Increased ASA dose with close follow-up and repeat TTE  Prior Data Review Relevant Labs - ESR: 73 (7/24/24) --> 69 (8/2/24) --> 73 (9/12/24) --> 29 (10/7/224) --> <3 (11/22/24) - CRP: 140 (7/24/24) --> 247 (8/2/24) --> 61 (9/12/24) --> 5.1 (10/7/24) --> 6 (12/3/24) EKG - 2/3/25: NSR. Normal EKG.  - 11/1/24: SR. Low voltage. nonspecific ST/T wave abnormality.  - 9/26/2024. SR. Anterior TWI. - 9/11/2024: SR. Nonspecific T wave abnormality. - 9/5/2024: SR. Diffuse TWI likely representing resolution of pericarditis. Compared to prior EKG on 8/3/2024 the TWI are new and ST elevations have resolved. Echo - 10/9/24 SOCRATES: mild MR, mild TR, mildly dilated aortic root 4.1, no vegetations - 10/7/24 TTE: nl LV and RV, no sig valve disease, no effusion, root mildly dilated 4.2 - 9/2024 TTE: LVEF 55-60%, nl RV, mild TR, dilated aortic root 4.1cm, dilated ascending aorta (4.0m), no pericardial effusion - 8/2024 TTE: LVEF 54%, no rwma, g1dd, mildly enlarged RV with normal size, trace pericardial effusion.  Cath - no prior Stress Test - no prior Other - 8/2024 CTPE: noted coronary artery calcifications  - 4/2022 CTA Chest Aorta: Root 4.1cm, ascending aorta 3.8cm (stable since 4/2021)

## 2025-04-09 NOTE — HISTORY OF PRESENT ILLNESS
[FreeTextEntry1] : Mr. Messer is a 60 year-old man with a history of chronic lymphocytic leukemia, diarrhea presenting for follow-up of subclinical hypothyroidism, weight gain, and osteopenia. I saw him for an initial visit in July 2019 and last in October 2024.  History of subclinical hypothyroidism. Laboratory tests in May 2019 significant for TSH 4.93 uIU/mL (normal: 0.27-4.20) with normal free T4. Prior TSH 1.50 uIU/mL in July 2016. TSH in July 2019 again elevated at 4.48 uIU/mL with normal T4/T3 and negative antibodies. We started a trial of levothyroxine 50 mcg daily in July 2019; he felt no difference on levothyroxine therapy and we discontinued in November. TSH subsequently within range until April 2023, when his TSH was 7.58 uIU/mL. He was started on levothyroxine 25 mg daily at that time. We have adjusted his dose to 50 mcg daily. He is currently taking levothyroxine 50 mcg daily.  He is taking levothyroxine in the morning, on an empty stomach, with plain water, and waiting at least 30 minutes before eating. He is taking a calcium supplement and  from levothyroxine.  No history of radiation exposure other than medical imaging. No family history of thyroid disease.  Elevated body mass index. History of knee osteoarthritis.  His highest weight in our system was 187 pounds.  He estimated 1500 kcal/day intake and was walking >10K steps daily; he had multiple metacarpal fractures and physical activity was subsequently limited.  In November 2019 we started bupropion with good effect. He stopped in February 2021 during hospitalization for COVID-19 infection and restarted in July 2021. In May 2023 we started naltrexone 25 mg daily; he had been off for a period of time due to a lapsed prescription.  He is currently taking bupropion  mg daily and naltrexone 25 mg daily.  His personal goal weight is 150 pounds.   Osteopenia.  Bone density testing in May 2022 significant for T-scores of -0.5 at the lumbar spine, -1.5 at the femoral neck, and -0.9 at the total hip. His 10 year fracture risk calculated by FRAX is 5.3% for major osteoporotic fracture and 0.6% for hip fracture, below the treatment thresholds.  He has a history of metatarsal fractures and a stress fracture in his foot.  No parental history of hip fracture.  He has at least a glass of milk daily and a calcium supplement with vitamin D.   Interim History  He has not had dexamethasone suppression testing as discussed last visit. He has seen multiple providers; notes reviewed. Last laboratory results reviewed. He has had persistent gastrointestinal issues after treatment for Campylobacter fetus bacteremia.  Weight has been up and down. No acute issues. Medical and surgical history, medications, allergies, social and family history reviewed and updated as needed.

## 2025-04-09 NOTE — PHYSICAL EXAM
[Alert] : alert [Healthy Appearance] : healthy appearance [No Acute Distress] : no acute distress [Normal Sclera/Conjunctiva] : normal sclera/conjunctiva [Normal Hearing] : hearing was normal [No Respiratory Distress] : no respiratory distress [No Stigmata of Cushings Syndrome] : no stigmata of Cushings Syndrome [Normal Gait] : normal gait [Normal Insight/Judgement] : insight and judgment were intact [Kyphosis] : no kyphosis present [Acanthosis Nigricans] : no acanthosis nigricans [de-identified] : no moon facies, no supraclavicular fat pads

## 2025-04-09 NOTE — DATA REVIEWED
[FreeTextEntry1] : Most recent bone mineral density Date: May 2, 2022 Source: Hologic Site: [facility]  Site	BMD	T-score	Change previous	Change baseline	 Lumbar spine	1.034	-0.5			 Femoral neck	0.725	-1.5			 Total hip           0.894	-0.9			 Distal radius					 DXA comments: Baseline scan at this facility  Impression: I have personally reviewed the DXA images and report. There is osteopenia at the femoral neck.

## 2025-04-09 NOTE — ASSESSMENT
[FreeTextEntry1] : Subclinical hypothyroidism. We started a trial of levothyroxine for TSH above the upper limit of normal but below 6.9 uIU/mL; he noted no difference in symptoms on levothyroxine therapy and we discontinued. TSH was subsequently above 7.5 uIU/mL and levothyroxine therapy was restarted. We have reviewed proper use and compliance with levothyroxine. He has been biochemically euthyroid on levothyroxine 50 mcg daily.  Weight gain. History of knee osteoarthritis. His weight has been up and down with his acute illness. He has been biochemically euthyroid. We will evaluate for endogenous overproduction of cortisol. His personal goal weight is 150 pounds. We have discussed lifestyle modifications for weight loss. We have discussed pharmacologic options for weight loss. He is tolerating bupropion and naltrexone and we will continue. We can consider metformin or a GLP-1 receptor agonist in the future as needed.  Osteopenia. He has a history of metatarsal fractures and a stress fracture in his foot. No parental history of hip fracture. He has at least a glass of milk daily and a calcium supplement with vitamin D. His 10 year fracture risk calculated by FRAX is 5.3% for major osteoporotic fracture and 0.6% for hip fracture, below the treatment thresholds. We will obtain interval bone density testing in May 2025.  Elevated normetanephrines. Laboratory evaluation for hypertension demonstrated an elevated plasma normetanephrine, although less than two times the upper limit of normal. 24 hour urine test results from June 2023 demonstrated a borderline elevated epinephrine; there would be concern if this value were 2-3 times the upper limit of normal. Metanephrines and catecholamines otherwise within the normal range in May 2023 and January 2024. No further evaluation needed at this time.   Return to see me in 6 months or earlier as needed.

## 2025-05-16 NOTE — HISTORY OF PRESENT ILLNESS
[de-identified] : Mr Gurdeep Messer is a 60 year old male here for f/u of CLL/SLL.  Hematologic/Oncologic history: 1.  CLL/SLL --pt reports original dx in [place] in the mid 1990s.  watchful waiting --presented with extensive lymphadenopathy in 2016.  excisional biopsy confirmed SLL.  FISH with del(11q).  Treated by Dr Winslow with bendamustine/rituximab x 6 cycles --attained response to BR but lasted < 1 year --progressive, bulky adenopathy in 2018.  Started Ibrutinib.  2.  hypogammaglobulinemia --likely due to CLL/SLL and treatment --has had severe infections - including a prolonged and severe bout with Covid-19.   Campylobacter bacteremia/sepsis in Summer 2024. --started IVIG Summer 2024  3.  recurrent DVT/PE --first in 2016 PICC line associated during chemotherapy --then recurrent pulmonary embolism in 2021 associated with covid-19 --long term eliquis 2.5 mg po BID --hx of weak positive anticardiolipin antibody but not fulfilling criteria for APLAS.  MTHFR mutation of questionable significance.  PMH, PSH reviewed and updated in allscripts [facility] - not contributory SH - . lives in [place].  Works for [employer] in [place].  former smoker.  originally from [place].  [de-identified] : Here for follow up. He is overall feeling well. His only complaint is persistent diarrhea (about 3 times a week) and on the days he has it, he usually has 4- 5 bowel movements, of varying consistency. Yesterday, he had 5 bowel movements and took loperamide, diphenoxylate-atropine as well as cholestyramine. He usually takes loperamide only and takes cholestyramine only if he absolutely needs it. Otherwise, he notes full resolution of fevers, night sweats, chest pain, pericarditis symptoms, bleeding, MSK pain, rash.

## 2025-05-16 NOTE — PHYSICAL EXAM
[Fully active, able to carry on all pre-disease performance without restriction] : Status 0 - Fully active, able to carry on all pre-disease performance without restriction [de-identified] : +xerosis with few <1cm areas of cracked skin, no warmth, purulence or fluctuance [Normal] : normal appearance, no rash, nodules, vesicles, ulcers, erythema [de-identified] : EOMI, no conjunctival injection, anicteric [de-identified] : supple [de-identified] : No calf tenderness or lower extremity swelling. [de-identified] : No palpable cervical, supraclavicular, axillary, or inguinal lymphadenopathy.

## 2025-05-16 NOTE — HISTORY OF PRESENT ILLNESS
[de-identified] : Mr Gurdeep Messer is a 60 year old male here for f/u of CLL/SLL.  Hematologic/Oncologic history: 1.  CLL/SLL --pt reports original dx in [place] in the mid 1990s.  watchful waiting --presented with extensive lymphadenopathy in 2016.  excisional biopsy confirmed SLL.  FISH with del(11q).  Treated by Dr Winslow with bendamustine/rituximab x 6 cycles --attained response to BR but lasted < 1 year --progressive, bulky adenopathy in 2018.  Started Ibrutinib.  2.  hypogammaglobulinemia --likely due to CLL/SLL and treatment --has had severe infections - including a prolonged and severe bout with Covid-19.   Campylobacter bacteremia/sepsis in Summer 2024. --started IVIG Summer 2024  3.  recurrent DVT/PE --first in 2016 PICC line associated during chemotherapy --then recurrent pulmonary embolism in 2021 associated with covid-19 --long term eliquis 2.5 mg po BID --hx of weak positive anticardiolipin antibody but not fulfilling criteria for APLAS.  MTHFR mutation of questionable significance.  PMH, PSH reviewed and updated in allscripts [facility] - not contributory SH - . lives in [place].  Works for [employer] in [place].  former smoker.  originally from [place].  [de-identified] : Here for follow up. He is overall feeling well. His only complaint is persistent diarrhea (about 3 times a week) and on the days he has it, he usually has 4- 5 bowel movements, of varying consistency. Yesterday, he had 5 bowel movements and took loperamide, diphenoxylate-atropine as well as cholestyramine. He usually takes loperamide only and takes cholestyramine only if he absolutely needs it. Otherwise, he notes full resolution of fevers, night sweats, chest pain, pericarditis symptoms, bleeding, MSK pain, rash.

## 2025-05-16 NOTE — PHYSICAL EXAM
[Fully active, able to carry on all pre-disease performance without restriction] : Status 0 - Fully active, able to carry on all pre-disease performance without restriction [de-identified] : +xerosis with few <1cm areas of cracked skin, no warmth, purulence or fluctuance [Normal] : normal appearance, no rash, nodules, vesicles, ulcers, erythema [de-identified] : EOMI, no conjunctival injection, anicteric [de-identified] : supple [de-identified] : No calf tenderness or lower extremity swelling. [de-identified] : No palpable cervical, supraclavicular, axillary, or inguinal lymphadenopathy.

## 2025-05-16 NOTE — REVIEW OF SYSTEMS
[Chest Pain] : chest pain [Abdominal Pain] : abdominal pain [de-identified] : +xerosis of fingers [Fever] : no fever [Chills] : no chills [Night Sweats] : no night sweats [Fatigue] : no fatigue [Eye Pain] : no eye pain [Vision Problems] : no vision problems [Dysphagia] : no dysphagia [Odynophagia] : no odynophagia [Palpitations] : no palpitations [Lower Ext Edema] : no lower extremity edema [Shortness Of Breath] : no shortness of breath [Wheezing] : no wheezing [Cough] : no cough [Vomiting] : no vomiting [Dysuria] : no dysuria [Joint Pain] : no joint pain [Skin Rash] : no skin rash [Skin Wound] : no skin wound [Fainting] : no fainting [Depression] : no depression [Muscle Weakness] : no muscle weakness [Easy Bleeding] : no tendency for easy bleeding [Easy Bruising] : no tendency for easy bruising [FreeTextEntry7] : +diarrhea

## 2025-05-16 NOTE — HISTORY OF PRESENT ILLNESS
[de-identified] : Mr Gurdeep Messer is a 60 year old male here for f/u of CLL/SLL.  Hematologic/Oncologic history: 1.  CLL/SLL --pt reports original dx in [place] in the mid 1990s.  watchful waiting --presented with extensive lymphadenopathy in 2016.  excisional biopsy confirmed SLL.  FISH with del(11q).  Treated by Dr Winslow with bendamustine/rituximab x 6 cycles --attained response to BR but lasted < 1 year --progressive, bulky adenopathy in 2018.  Started Ibrutinib.  2.  hypogammaglobulinemia --likely due to CLL/SLL and treatment --has had severe infections - including a prolonged and severe bout with Covid-19.   Campylobacter bacteremia/sepsis in Summer 2024. --started IVIG Summer 2024  3.  recurrent DVT/PE --first in 2016 PICC line associated during chemotherapy --then recurrent pulmonary embolism in 2021 associated with covid-19 --long term eliquis 2.5 mg po BID --hx of weak positive anticardiolipin antibody but not fulfilling criteria for APLAS.  MTHFR mutation of questionable significance.  PMH, PSH reviewed and updated in allscripts [facility] - not contributory SH - . lives in [place].  Works for [employer] in [place].  former smoker.  originally from [place].  [de-identified] : Here for follow up. He is overall feeling well. His only complaint is persistent diarrhea (about 3 times a week) and on the days he has it, he usually has 4- 5 bowel movements, of varying consistency. Yesterday, he had 5 bowel movements and took loperamide, diphenoxylate-atropine as well as cholestyramine. He usually takes loperamide only and takes cholestyramine only if he absolutely needs it. Otherwise, he notes full resolution of fevers, night sweats, chest pain, pericarditis symptoms, bleeding, MSK pain, rash.

## 2025-05-16 NOTE — PHYSICAL EXAM
[Fully active, able to carry on all pre-disease performance without restriction] : Status 0 - Fully active, able to carry on all pre-disease performance without restriction [de-identified] : +xerosis with few <1cm areas of cracked skin, no warmth, purulence or fluctuance [Normal] : normal appearance, no rash, nodules, vesicles, ulcers, erythema [de-identified] : EOMI, no conjunctival injection, anicteric [de-identified] : supple [de-identified] : No calf tenderness or lower extremity swelling. [de-identified] : No palpable cervical, supraclavicular, axillary, or inguinal lymphadenopathy.

## 2025-05-16 NOTE — END OF VISIT
[] : Fellow [FreeTextEntry3] : I evaluated hte patient with the hematology fellow, Dr Farida Woody.  The patient is followed for CLL.  The patient has not had any major problems or issues since his last visit.  He denies fevers and night sweats.  Unfortunately he has chronic diarrhea following the previous complicated infection with Campylobacter fetus.  He denies any bleeding.  He continues IVIG under Dr Cruz's direction.    Reviewed CBC + CMP.  At the patient's request we tommy labs for his cardiologist and endocrinologist since he had orders and these were due.  one of these was a cortisol level;  but it was for a dex suppression test and he did not take dexamethasone as prescribed prior to the lab draw.  Plan: 1. CLL/SLL - no plans to discontinue ibrutinib.  Recent labs reassuring - no concerns for disease progression.  He would be at risk for rapid disease progression if this drug were stopped/held.  Continue therapy.  CBC and CMP every 4 months barring problems in the interim.  2.  chronic diarrhea - would benefit from more consistent use of cholestyramine.  encouraged this.  needs to separate dosing from other medications by at least 2 hours.  3.  hypogammaglobulinemia - now on IVIG - managed by Dr Cruz immunologist.   --vaccinations (influenza, covid-19) strongly encouraged.  4.  DVT/PE - continue eliquis  RTC 4 months, sooner if issues.

## 2025-05-16 NOTE — REVIEW OF SYSTEMS
[Chest Pain] : chest pain [Abdominal Pain] : abdominal pain [de-identified] : +xerosis of fingers [Fever] : no fever [Chills] : no chills [Night Sweats] : no night sweats [Fatigue] : no fatigue [Eye Pain] : no eye pain [Vision Problems] : no vision problems [Dysphagia] : no dysphagia [Odynophagia] : no odynophagia [Palpitations] : no palpitations [Lower Ext Edema] : no lower extremity edema [Shortness Of Breath] : no shortness of breath [Wheezing] : no wheezing [Cough] : no cough [Vomiting] : no vomiting [Dysuria] : no dysuria [Joint Pain] : no joint pain [Skin Rash] : no skin rash [Skin Wound] : no skin wound [Fainting] : no fainting [Depression] : no depression [Muscle Weakness] : no muscle weakness [Easy Bleeding] : no tendency for easy bleeding [Easy Bruising] : no tendency for easy bruising [FreeTextEntry7] : +diarrhea

## 2025-05-16 NOTE — ASSESSMENT
[FreeTextEntry1] : Gurdeep Messer is a 60 year old man with CLL/SLL. First treated in 2016 for extensive/symptomatic lymphadenopathy with bendamustine/rituximab. Progressive disease in 2018. Started Ibrutinib and has had an excellent response.  Plan: 1.# CLL/SLL -- Labs reviewed. Continue ibrutinib 420mg PO daily --CT C/A/P (7/3/24) without any evidence for progressive CLL/SLL or transformation --reviewed bleeding precautions - need to notify office in anticipation of any surgeries so drug can be held --indefinite treatment planned --check FISH at time of progression  2. # Persistent diarrhea --hx Campylobacter infection c/b bacteremia. now resolved.  chronic diarrhea persists. -- Reommended supportive medications including loperamide, diphenoxylate-atropine and cholestyramine. Recommeded taking cholestyramine more consistently.  3.# hx DVT, PE --recurrent provoked events but he has been advised by previous hematologist Dr. Winslow to continue long term Eliquis indefinitely. He is tolerating w/o any bleeding concerns on 2.5 mg po BID. advised to continue at this time. --continue Eliquis 2.5mg BID  4.# hypogammaglobulinemia --related to CLL/SLL and its treatment --goal for CLL/SLL pt would be to maintain IgG level > 500.  --he has had persistently low IgG levels and has had recurrent infections. Follows with immunologist (Dr. Cruz). started IVIG --Continues on Q3 weeks IVIG--next dose tomorrow.   RTC on 9/10/25 Labs- CBC, CMP, LDH  Please see attending physician attestation below.

## 2025-05-16 NOTE — REVIEW OF SYSTEMS
[Chest Pain] : chest pain [Abdominal Pain] : abdominal pain [de-identified] : +xerosis of fingers [Fever] : no fever [Chills] : no chills [Night Sweats] : no night sweats [Fatigue] : no fatigue [Eye Pain] : no eye pain [Vision Problems] : no vision problems [Dysphagia] : no dysphagia [Odynophagia] : no odynophagia [Palpitations] : no palpitations [Lower Ext Edema] : no lower extremity edema [Shortness Of Breath] : no shortness of breath [Wheezing] : no wheezing [Cough] : no cough [Vomiting] : no vomiting [Dysuria] : no dysuria [Joint Pain] : no joint pain [Skin Rash] : no skin rash [Skin Wound] : no skin wound [Fainting] : no fainting [Depression] : no depression [Muscle Weakness] : no muscle weakness [Easy Bleeding] : no tendency for easy bleeding [Easy Bruising] : no tendency for easy bruising [FreeTextEntry7] : +diarrhea

## 2025-07-21 NOTE — HISTORY OF PRESENT ILLNESS
[FreeTextEntry1] : Follow up visit for diarrhea. Feels well today. Reports 2-3 loose to formed BMs daily. Taking cholestyramine and taking prn loperamide.  He denies fever, chills, nausea, vomiting, rectal bleeding, melena. Tolerating diet. Labs reviewed: normal WBC, Hb. Mild elevation in AST with normal ALT, bili, alk phos.

## 2025-07-21 NOTE — REVIEW OF SYSTEMS
[As Noted in HPI] : as noted in HPI [Negative] : Heme/Lymph [Abdominal Pain] : no abdominal pain [Vomiting] : no vomiting [Constipation] : no constipation [Heartburn] : no heartburn [Bleeding] : no bleeding [Bloating (gassiness)] : no bloating [FreeTextEntry7] : loose BMs per above

## 2025-07-21 NOTE — ASSESSMENT
[FreeTextEntry1] : Advised the following:  Get stool tests (ordered). Continue current medications. Supportive management. Follow up in 6 months, or earlier if needed. He agrees.

## 2025-07-24 NOTE — HISTORY OF PRESENT ILLNESS
[de-identified] :   DANITA MARC is a 61 year male who works for [employer] presenting to the office complaining of left knee pain. Patient reports pain for 2 months. Patient states he may have injured his knee while walking his dog when his dog pulled him and he hyperextended his knee.  The patient describes the pain as a dull aching, and occasionally sharp pain localized to the medial and lateral aspect of his knee that is intermittent in nature. His symptoms are exacerbated with bending the knee, walking, standing from a seated position, and going up and down the stairs. He reports the pain has been waking him up at night. Patient denies instability, catching or locking of the knee. He denies numbness or tingling in the left lower extremity.  Patient is taking tylenol prn for pain relief with temporary relief in symptoms. Denies catching or locking or giving out. Of note, patient reports history of Left ACL reconstruction surgery in 2004. He also notes active CLL currently under treatment receiving IVIG and Ibrutinib.

## 2025-07-24 NOTE — ADDENDUM
[FreeTextEntry1] : I, Kenney Christianson, acted solely as a scribe for Dr. Isidro Leyva on this date 07/24/2025.  All medical record entries made by the Scribe were at my, Dr. Isidro Leyva, direction and personally dictated by me on 07/24/2025. I have reviewed the chart and agree that the record accurately reflects my personal performance of the history, physical exam, assessment and plan. I have also personally directed, reviewed, and agreed with the chart.

## 2025-07-24 NOTE — DISCUSSION/SUMMARY
[de-identified] : 62 y/o male presents with an acute change in left knee pain as of 2 months ago. He has moderate OA in the left knee and a prior medical history of a meniscus tear and ACL surgery.   An MRI was ordered to r/o a median meniscal tear vs stress fracture   FU after MRI to go over results

## 2025-07-24 NOTE — PHYSICAL EXAM
[de-identified] : Right Lower Extremity o Knee :  Inspection/Palpation : no medial joint line tenderness to palpation, no lateral joint line tenderness to palpation, no swelling, no deformity  Range of Motion : 0 - 120 degrees, no crepitus  Stability : no valgus or varus instability present on provocative testing, Lachmans Test (-)  Strength : flexion and extension 5/5 Additional tests: o Muscle Bulk : normal muscle bulk present o Skin : no erythema, no ecchymosis o Sensation : sensation to pin intact o Vascular Exam : no edema, no cyanosis, dorsalis pedis artery pulse 2+, posterior tibial artery pulse 2+  Left Lower Extremity o Knee : No Effusion  Inspection/Palpation : medial joint line tenderness to palpation, no lateral joint line tenderness to palpation, no swelling, no deformity  Range of Motion : 0 -135 degrees, + crepitus Mild grind sign.   Stability : no valgus or varus instability present on provocative testing, Lachmans Test (-)  Strength : flexion and extension 5/5 Additional tests: o Muscle Bulk : normal muscle bulk present o Skin : no erythema, no ecchymosis o Sensation : sensation to pin intact o Vascular Exam : no edema, no cyanosis, dorsalis pedis artery pulse 2+, posterior tibial artery pulse 2+    [de-identified] : _________________________ XR obtained on 07/24/2025 :  Left Knee : moderate tricompartmental OA evidence of interference screw in the distal; femur and tibialis post. some joint space narrowing.

## 2025-07-24 NOTE — DISCUSSION/SUMMARY
[de-identified] : 60 y/o male presents with an acute change in left knee pain as of 2 months ago. He has moderate OA in the left knee and a prior medical history of a meniscus tear and ACL surgery.   An MRI was ordered to r/o a median meniscal tear vs stress fracture   FU after MRI to go over results

## 2025-07-24 NOTE — HISTORY OF PRESENT ILLNESS
[de-identified] :   DANITA MARC is a 61 year male who works for [employer] presenting to the office complaining of left knee pain. Patient reports pain for 2 months. Patient states he may have injured his knee while walking his dog when his dog pulled him and he hyperextended his knee.  The patient describes the pain as a dull aching, and occasionally sharp pain localized to the medial and lateral aspect of his knee that is intermittent in nature. His symptoms are exacerbated with bending the knee, walking, standing from a seated position, and going up and down the stairs. He reports the pain has been waking him up at night. Patient denies instability, catching or locking of the knee. He denies numbness or tingling in the left lower extremity.  Patient is taking tylenol prn for pain relief with temporary relief in symptoms. Denies catching or locking or giving out. Of note, patient reports history of Left ACL reconstruction surgery in 2004. He also notes active CLL currently under treatment receiving IVIG and Ibrutinib.

## 2025-07-24 NOTE — PHYSICAL EXAM
[de-identified] : Right Lower Extremity o Knee :  Inspection/Palpation : no medial joint line tenderness to palpation, no lateral joint line tenderness to palpation, no swelling, no deformity  Range of Motion : 0 - 120 degrees, no crepitus  Stability : no valgus or varus instability present on provocative testing, Lachmans Test (-)  Strength : flexion and extension 5/5 Additional tests: o Muscle Bulk : normal muscle bulk present o Skin : no erythema, no ecchymosis o Sensation : sensation to pin intact o Vascular Exam : no edema, no cyanosis, dorsalis pedis artery pulse 2+, posterior tibial artery pulse 2+  Left Lower Extremity o Knee : No Effusion  Inspection/Palpation : medial joint line tenderness to palpation, no lateral joint line tenderness to palpation, no swelling, no deformity  Range of Motion : 0 -135 degrees, + crepitus Mild grind sign.   Stability : no valgus or varus instability present on provocative testing, Lachmans Test (-)  Strength : flexion and extension 5/5 Additional tests: o Muscle Bulk : normal muscle bulk present o Skin : no erythema, no ecchymosis o Sensation : sensation to pin intact o Vascular Exam : no edema, no cyanosis, dorsalis pedis artery pulse 2+, posterior tibial artery pulse 2+    [de-identified] : _________________________ XR obtained on 07/24/2025 :  Left Knee : moderate tricompartmental OA evidence of interference screw in the distal; femur and tibialis post. some joint space narrowing.

## 2025-07-28 NOTE — HISTORY OF PRESENT ILLNESS
[FreeTextEntry1] : Mr. MARC is a 61 year male presenting for follow-up. Here with his gibran Cowan.   Notable PMHx: - History of Incessant Pericarditis 2/2 campylobacter fetus (8/2024) - Ascending Aortic Aneurysm (4.1 cm by SOCRATES in 10/2024) --- follows with Dr. 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 Having difficulty ambulating. Saw ortho and pending an MRI. Had an episode of night sweats recently. No fever, chest pain, dyspnea, cough. No recent illness he can recall aside from ongoing diarrhea.   2/11/25 pericarditis has resolved. Later developed likely norovirus for which he is continuing to recover from as a result of his immunocompromised state. Stopped colchicine 2 months ago. Is having rib pain as a result of a fall on ice. No recurrence of pericarditis like symptoms.   11/1/24 Blood cx ordered at last visit came back with campylobacter. Eventually presented to [facility] for management he was seen by ID, cards, and onc. He underwent SOCRATES and PET/CT that showed no evidence of endocarditis or focus for his campylobacter fetus. Thought to be GI translocation in setting of immunocompromised state. He was sent home with a midline and meropenem x 4 weeks. Since discharge, no chest pain. He has quite a bit phlegm and secretions but otherwise no fevers. Rare night sweats and diarrhea, much better since discharge. No rashes  9/26/24 TTE showed resolution of pericardial effusion. He reports interval worsening of his chest pain and has developed night sweats as well. He reports a small circular erythematous rashes on his abdomen that were similar in appearance to when he first initially presented. He had a low grade temp to 100F last week but otherwise has not had any fevers. Notes diarrhea that has been worsening since discharge. Otherwise no recent travel, sick contacts, N/V. He was seen by his oncologist yesterday who recommended he follow-up with ID and he is currently awaiting an appointment for this.   Visit 9/12/24: Since last visit, his pain has improved but is still present. Though he notes the pain is actually better when laying flat changed from prior. He did have some constant pain in the same area that resolved with one dose of tylenol. No bleeding issues while on high dose ASA. No dyspnea.  Visit 9/5/2024: Here for follow up after hospital admission at [facility] 8/2-8/13  for multiple symptoms including diarrhea, fevers, abdominal distention, rash, and chest pain. Found to have diffuse ST elevation, ESR 69, , and TTE with normal biventricular function and a small pericardial effusion.  The differential for his pericarditis was ibrutinib related though he had been on this for 7 years and campylobacter bacteremia. He was treated with abx and ASA, colchicine, and PPI for pericarditis. Since discharge his pain was initially improving but began to recur since he had IVIG on Saturday. He reports the same positional, pleuritic chest pain though of significantly lesser severity than prior but increased when compared to immeidately prior to IVIG. He experiences dyspnea after walking his dogs about 3 miles though this was occurring well prior to his presentation. His pain is not exertional. Increased ASA dose with close follow-up and repeat TTE  EKG: NSR. Normal EKG  Data Review Echo - 10/9/24 SOCRATES: mild MR, mild TR, mildly dilated aortic root 4.1, no vegetations - 10/7/24 TTE: nl LV and RV, no sig valve disease, no effusion, root mildly dilated 4.2 - 9/2024 TTE: LVEF 55-60%, nl RV, mild TR, dilated aortic root 4.1cm, dilated ascending aorta (4.0m), no pericardial effusion - 8/2024 TTE: LVEF 54%, no rwma, g1dd, mildly enlarged RV with normal size, trace pericardial effusion.  Cath -  Stress  -  Other - 8/2024 CTPE: noted coronary artery calcifications  - 4/2022 CTA Chest Aorta: Root 4.1cm, ascending aorta 3.8cm (stable since 4/2021)  ===

## 2025-07-28 NOTE — DISCUSSION/SUMMARY
[EKG obtained to assist in diagnosis and management of assessed problem(s)] : EKG obtained to assist in diagnosis and management of assessed problem(s) [FreeTextEntry1] : Here for follow-up with history of incessant pericarditis and with knee swelling. Given his immunosuppression and history of bacteremia and pericarditis, will check blood cultures and inform his ID specialist Dr. Pearl.  # Knee Pain/Swelling - blood cultures x 2  # Dilated Aortic Root # Ascending Aortic Aneurysm - MRA for surveillance - TTE next year  # HLD # CACS - c/w crestor 10mg qd for now - counseled on lifestyle changes - check lpa  # Incessant Pericarditis 2/2 Campylobacter Fetus Bacteremia, resolved  RTC in 6 months in person sooner PRN.

## 2025-07-28 NOTE — PHYSICAL EXAM
[TextEntry] : General: NAD Neck: no carotid bruits Cardiac: nl s1s2, RRR, no mrg, no JVD, no peripheral edema Lungs: CTAB. Normal respiratory effort Vascular: lower extremities warm/well perfused Extremities: L knee swelling, ttp, mild erythema, no wounds noted. Neuro: moves all extremities Psych: normal affect, normal mood

## 2025-07-28 NOTE — END OF VISIT
[Time Spent: ___ minutes] : I have spent [unfilled] minutes of time on the encounter which excludes teaching and separately reported services.
[Time Spent: ___ minutes] : I have spent [unfilled] minutes of time on the encounter which excludes teaching and separately reported services.
Detail Level: Detailed
Quality 431: Preventive Care And Screening: Unhealthy Alcohol Use - Screening: Patient not identified as an unhealthy alcohol user when screened for unhealthy alcohol use using a systematic screening method
Quality 130: Documentation Of Current Medications In The Medical Record: Current Medications Documented
Quality 226: Preventive Care And Screening: Tobacco Use: Screening And Cessation Intervention: Patient screened for tobacco use and is an ex/non-smoker

## 2025-07-28 NOTE — HISTORY OF PRESENT ILLNESS
[FreeTextEntry1] : Mr. MARC is a 61 year male presenting for follow-up. Here with his gibran Cowan.   Notable PMHx: - History of Incessant Pericarditis 2/2 campylobacter fetus (8/2024) - Ascending Aortic Aneurysm (4.1 cm by SOCRATES in 10/2024) --- follows with Dr. 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 Having difficulty ambulating. Saw ortho and pending an MRI. Had an episode of night sweats recently. No fever, chest pain, dyspnea, cough. No recent illness he can recall aside from ongoing diarrhea.   2/11/25 pericarditis has resolved. Later developed likely norovirus for which he is continuing to recover from as a result of his immunocompromised state. Stopped colchicine 2 months ago. Is having rib pain as a result of a fall on ice. No recurrence of pericarditis like symptoms.   11/1/24 Blood cx ordered at last visit came back with campylobacter. Eventually presented to [facility] for management he was seen by ID, cards, and onc. He underwent SOCRATES and PET/CT that showed no evidence of endocarditis or focus for his campylobacter fetus. Thought to be GI translocation in setting of immunocompromised state. He was sent home with a midline and meropenem x 4 weeks. Since discharge, no chest pain. He has quite a bit phlegm and secretions but otherwise no fevers. Rare night sweats and diarrhea, much better since discharge. No rashes  9/26/24 TTE showed resolution of pericardial effusion. He reports interval worsening of his chest pain and has developed night sweats as well. He reports a small circular erythematous rashes on his abdomen that were similar in appearance to when he first initially presented. He had a low grade temp to 100F last week but otherwise has not had any fevers. Notes diarrhea that has been worsening since discharge. Otherwise no recent travel, sick contacts, N/V. He was seen by his oncologist yesterday who recommended he follow-up with ID and he is currently awaiting an appointment for this.   Visit 9/12/24: Since last visit, his pain has improved but is still present. Though he notes the pain is actually better when laying flat changed from prior. He did have some constant pain in the same area that resolved with one dose of tylenol. No bleeding issues while on high dose ASA. No dyspnea.  Visit 9/5/2024: Here for follow up after hospital admission at [facility] 8/2-8/13  for multiple symptoms including diarrhea, fevers, abdominal distention, rash, and chest pain. Found to have diffuse ST elevation, ESR 69, , and TTE with normal biventricular function and a small pericardial effusion.  The differential for his pericarditis was ibrutinib related though he had been on this for 7 years and campylobacter bacteremia. He was treated with abx and ASA, colchicine, and PPI for pericarditis. Since discharge his pain was initially improving but began to recur since he had IVIG on Saturday. He reports the same positional, pleuritic chest pain though of significantly lesser severity than prior but increased when compared to immeidately prior to IVIG. He experiences dyspnea after walking his dogs about 3 miles though this was occurring well prior to his presentation. His pain is not exertional. Increased ASA dose with close follow-up and repeat TTE  EKG: NSR. Normal EKG  Data Review Echo - 10/9/24 SOCRATES: mild MR, mild TR, mildly dilated aortic root 4.1, no vegetations - 10/7/24 TTE: nl LV and RV, no sig valve disease, no effusion, root mildly dilated 4.2 - 9/2024 TTE: LVEF 55-60%, nl RV, mild TR, dilated aortic root 4.1cm, dilated ascending aorta (4.0m), no pericardial effusion - 8/2024 TTE: LVEF 54%, no rwma, g1dd, mildly enlarged RV with normal size, trace pericardial effusion.  Cath -  Stress  -  Other - 8/2024 CTPE: noted coronary artery calcifications  - 4/2022 CTA Chest Aorta: Root 4.1cm, ascending aorta 3.8cm (stable since 4/2021)  ===